# Patient Record
Sex: FEMALE | Race: WHITE | Employment: UNEMPLOYED | URBAN - METROPOLITAN AREA
[De-identification: names, ages, dates, MRNs, and addresses within clinical notes are randomized per-mention and may not be internally consistent; named-entity substitution may affect disease eponyms.]

---

## 2018-01-01 ENCOUNTER — APPOINTMENT (OUTPATIENT)
Dept: OCCUPATIONAL THERAPY | Facility: CLINIC | Age: 53
DRG: 163 | End: 2018-01-01
Attending: INTERNAL MEDICINE
Payer: MEDICARE

## 2018-01-01 ENCOUNTER — APPOINTMENT (OUTPATIENT)
Dept: CT IMAGING | Facility: CLINIC | Age: 53
DRG: 163 | End: 2018-01-01
Attending: INTERNAL MEDICINE
Payer: MEDICARE

## 2018-01-01 ENCOUNTER — APPOINTMENT (OUTPATIENT)
Dept: GENERAL RADIOLOGY | Facility: CLINIC | Age: 53
DRG: 163 | End: 2018-01-01
Attending: INTERNAL MEDICINE
Payer: MEDICARE

## 2018-01-01 ENCOUNTER — TRANSFERRED RECORDS (OUTPATIENT)
Dept: HEALTH INFORMATION MANAGEMENT | Facility: CLINIC | Age: 53
End: 2018-01-01

## 2018-01-01 ENCOUNTER — HOSPITAL ENCOUNTER (OUTPATIENT)
Facility: CLINIC | Age: 53
End: 2018-01-01
Attending: INTERNAL MEDICINE | Admitting: INTERNAL MEDICINE
Payer: MEDICARE

## 2018-01-01 ENCOUNTER — ANESTHESIA (OUTPATIENT)
Dept: SURGERY | Facility: CLINIC | Age: 53
DRG: 163 | End: 2018-01-01
Payer: MEDICARE

## 2018-01-01 ENCOUNTER — TELEPHONE (OUTPATIENT)
Dept: PULMONOLOGY | Facility: CLINIC | Age: 53
End: 2018-01-01

## 2018-01-01 ENCOUNTER — ANESTHESIA (OUTPATIENT)
Dept: MEDSURG UNIT | Facility: CLINIC | Age: 53
DRG: 163 | End: 2018-01-01
Payer: MEDICARE

## 2018-01-01 ENCOUNTER — APPOINTMENT (OUTPATIENT)
Dept: GENERAL RADIOLOGY | Facility: CLINIC | Age: 53
DRG: 163 | End: 2018-01-01
Attending: STUDENT IN AN ORGANIZED HEALTH CARE EDUCATION/TRAINING PROGRAM
Payer: MEDICARE

## 2018-01-01 ENCOUNTER — HOSPITAL ENCOUNTER (INPATIENT)
Facility: CLINIC | Age: 53
LOS: 5 days | Discharge: HOME | DRG: 163 | End: 2018-07-30
Attending: INTERNAL MEDICINE | Admitting: SURGERY
Payer: MEDICARE

## 2018-01-01 ENCOUNTER — APPOINTMENT (OUTPATIENT)
Dept: CARDIOLOGY | Facility: CLINIC | Age: 53
DRG: 163 | End: 2018-01-01
Attending: INTERNAL MEDICINE
Payer: MEDICARE

## 2018-01-01 ENCOUNTER — DOCUMENTATION ONLY (OUTPATIENT)
Dept: OTHER | Facility: CLINIC | Age: 53
End: 2018-01-01

## 2018-01-01 ENCOUNTER — ANESTHESIA EVENT (OUTPATIENT)
Dept: SURGERY | Facility: CLINIC | Age: 53
DRG: 163 | End: 2018-01-01
Payer: MEDICARE

## 2018-01-01 ENCOUNTER — ANESTHESIA EVENT (OUTPATIENT)
Dept: MEDSURG UNIT | Facility: CLINIC | Age: 53
DRG: 163 | End: 2018-01-01
Payer: MEDICARE

## 2018-01-01 ENCOUNTER — CARE COORDINATION (OUTPATIENT)
Dept: CARE COORDINATION | Facility: CLINIC | Age: 53
End: 2018-01-01

## 2018-01-01 VITALS
WEIGHT: 147.05 LBS | BODY MASS INDEX: 27.06 KG/M2 | TEMPERATURE: 98 F | RESPIRATION RATE: 20 BRPM | OXYGEN SATURATION: 99 % | HEART RATE: 72 BPM | DIASTOLIC BLOOD PRESSURE: 74 MMHG | SYSTOLIC BLOOD PRESSURE: 120 MMHG | HEIGHT: 62 IN

## 2018-01-01 DIAGNOSIS — J96.21 ACUTE ON CHRONIC RESPIRATORY FAILURE WITH HYPOXIA (H): Primary | ICD-10-CM

## 2018-01-01 LAB
ABO + RH BLD: NORMAL
ABO + RH BLD: NORMAL
ALBUMIN SERPL-MCNC: 3.1 G/DL (ref 3.4–5)
ALP SERPL-CCNC: 64 U/L (ref 40–150)
ALT SERPL W P-5'-P-CCNC: 33 U/L (ref 0–50)
ANION GAP SERPL CALCULATED.3IONS-SCNC: 12 MMOL/L (ref 3–14)
ANION GAP SERPL CALCULATED.3IONS-SCNC: 6 MMOL/L (ref 3–14)
ANION GAP SERPL CALCULATED.3IONS-SCNC: 6 MMOL/L (ref 3–14)
ANION GAP SERPL CALCULATED.3IONS-SCNC: 7 MMOL/L (ref 3–14)
ANION GAP SERPL CALCULATED.3IONS-SCNC: 8 MMOL/L (ref 3–14)
ANION GAP SERPL CALCULATED.3IONS-SCNC: 9 MMOL/L (ref 3–14)
APTT PPP: 26 SEC (ref 22–37)
AST SERPL W P-5'-P-CCNC: 29 U/L (ref 0–45)
BASE DEFICIT BLDA-SCNC: 0.5 MMOL/L
BASE DEFICIT BLDA-SCNC: 2.2 MMOL/L
BASE DEFICIT BLDA-SCNC: 4.4 MMOL/L
BASE EXCESS BLDV CALC-SCNC: 6.9 MMOL/L
BASOPHILS # BLD AUTO: 0 10E9/L (ref 0–0.2)
BASOPHILS NFR BLD AUTO: 0 %
BILIRUB SERPL-MCNC: 0.2 MG/DL (ref 0.2–1.3)
BLD GP AB SCN SERPL QL: NORMAL
BLOOD BANK CMNT PATIENT-IMP: NORMAL
BUN SERPL-MCNC: 10 MG/DL (ref 7–30)
BUN SERPL-MCNC: 10 MG/DL (ref 7–30)
BUN SERPL-MCNC: 4 MG/DL (ref 7–30)
BUN SERPL-MCNC: 5 MG/DL (ref 7–30)
BUN SERPL-MCNC: 5 MG/DL (ref 7–30)
BUN SERPL-MCNC: 6 MG/DL (ref 7–30)
CALCIUM SERPL-MCNC: 7.5 MG/DL (ref 8.5–10.1)
CALCIUM SERPL-MCNC: 7.6 MG/DL (ref 8.5–10.1)
CALCIUM SERPL-MCNC: 8.4 MG/DL (ref 8.5–10.1)
CALCIUM SERPL-MCNC: 8.5 MG/DL (ref 8.5–10.1)
CALCIUM SERPL-MCNC: 8.6 MG/DL (ref 8.5–10.1)
CALCIUM SERPL-MCNC: 9.1 MG/DL (ref 8.5–10.1)
CHLORIDE SERPL-SCNC: 103 MMOL/L (ref 94–109)
CHLORIDE SERPL-SCNC: 104 MMOL/L (ref 94–109)
CHLORIDE SERPL-SCNC: 105 MMOL/L (ref 94–109)
CHLORIDE SERPL-SCNC: 110 MMOL/L (ref 94–109)
CHLORIDE SERPL-SCNC: 113 MMOL/L (ref 94–109)
CHLORIDE SERPL-SCNC: 114 MMOL/L (ref 94–109)
CO2 SERPL-SCNC: 20 MMOL/L (ref 20–32)
CO2 SERPL-SCNC: 24 MMOL/L (ref 20–32)
CO2 SERPL-SCNC: 27 MMOL/L (ref 20–32)
CO2 SERPL-SCNC: 28 MMOL/L (ref 20–32)
CO2 SERPL-SCNC: 30 MMOL/L (ref 20–32)
CO2 SERPL-SCNC: 30 MMOL/L (ref 20–32)
COPATH REPORT: NORMAL
CREAT SERPL-MCNC: 0.5 MG/DL (ref 0.52–1.04)
CREAT SERPL-MCNC: 0.53 MG/DL (ref 0.52–1.04)
CREAT SERPL-MCNC: 0.6 MG/DL (ref 0.52–1.04)
CREAT SERPL-MCNC: 0.64 MG/DL (ref 0.52–1.04)
CREAT SERPL-MCNC: 0.69 MG/DL (ref 0.52–1.04)
CREAT SERPL-MCNC: 0.88 MG/DL (ref 0.52–1.04)
DIFFERENTIAL METHOD BLD: ABNORMAL
EOSINOPHIL # BLD AUTO: 0 10E9/L (ref 0–0.7)
EOSINOPHIL NFR BLD AUTO: 0 %
ERYTHROCYTE [DISTWIDTH] IN BLOOD BY AUTOMATED COUNT: 14.3 % (ref 10–15)
ERYTHROCYTE [DISTWIDTH] IN BLOOD BY AUTOMATED COUNT: 14.5 % (ref 10–15)
ERYTHROCYTE [DISTWIDTH] IN BLOOD BY AUTOMATED COUNT: 14.6 % (ref 10–15)
ERYTHROCYTE [DISTWIDTH] IN BLOOD BY AUTOMATED COUNT: 15.1 % (ref 10–15)
ERYTHROCYTE [DISTWIDTH] IN BLOOD BY AUTOMATED COUNT: 15.2 % (ref 10–15)
ERYTHROCYTE [DISTWIDTH] IN BLOOD BY AUTOMATED COUNT: 15.2 % (ref 10–15)
GFR SERPL CREATININE-BSD FRML MDRD: 67 ML/MIN/1.7M2
GFR SERPL CREATININE-BSD FRML MDRD: 88 ML/MIN/1.7M2
GFR SERPL CREATININE-BSD FRML MDRD: >90 ML/MIN/1.7M2
GLUCOSE BLDC GLUCOMTR-MCNC: 111 MG/DL (ref 70–99)
GLUCOSE BLDC GLUCOMTR-MCNC: 112 MG/DL (ref 70–99)
GLUCOSE BLDC GLUCOMTR-MCNC: 95 MG/DL (ref 70–99)
GLUCOSE SERPL-MCNC: 104 MG/DL (ref 70–99)
GLUCOSE SERPL-MCNC: 108 MG/DL (ref 70–99)
GLUCOSE SERPL-MCNC: 113 MG/DL (ref 70–99)
GLUCOSE SERPL-MCNC: 118 MG/DL (ref 70–99)
GLUCOSE SERPL-MCNC: 149 MG/DL (ref 70–99)
GLUCOSE SERPL-MCNC: 92 MG/DL (ref 70–99)
HCO3 BLD-SCNC: 24 MMOL/L (ref 21–28)
HCO3 BLD-SCNC: 25 MMOL/L (ref 21–28)
HCO3 BLD-SCNC: 25 MMOL/L (ref 21–28)
HCO3 BLDV-SCNC: 32 MMOL/L (ref 21–28)
HCT VFR BLD AUTO: 32.3 % (ref 35–47)
HCT VFR BLD AUTO: 33.1 % (ref 35–47)
HCT VFR BLD AUTO: 36.7 % (ref 35–47)
HCT VFR BLD AUTO: 37.4 % (ref 35–47)
HCT VFR BLD AUTO: 39.4 % (ref 35–47)
HCT VFR BLD AUTO: 39.6 % (ref 35–47)
HGB BLD-MCNC: 10.2 G/DL (ref 11.7–15.7)
HGB BLD-MCNC: 10.3 G/DL (ref 11.7–15.7)
HGB BLD-MCNC: 10.9 G/DL (ref 11.7–15.7)
HGB BLD-MCNC: 11.3 G/DL (ref 11.7–15.7)
HGB BLD-MCNC: 11.3 G/DL (ref 11.7–15.7)
HGB BLD-MCNC: 12.1 G/DL (ref 11.7–15.7)
HGB BLD-MCNC: 12.3 G/DL (ref 11.7–15.7)
HGB BLD-MCNC: 9.8 G/DL (ref 11.7–15.7)
IMM GRANULOCYTES # BLD: 0.1 10E9/L (ref 0–0.4)
IMM GRANULOCYTES NFR BLD: 0.3 %
INR PPP: 1.04 (ref 0.86–1.14)
INTERPRETATION ECG - MUSE: NORMAL
LACTATE BLD-SCNC: 0.7 MMOL/L (ref 0.7–2)
LYMPHOCYTES # BLD AUTO: 1.9 10E9/L (ref 0.8–5.3)
LYMPHOCYTES NFR BLD AUTO: 8.8 %
MAGNESIUM SERPL-MCNC: 1.9 MG/DL (ref 1.6–2.3)
MAGNESIUM SERPL-MCNC: 2 MG/DL (ref 1.6–2.3)
MAGNESIUM SERPL-MCNC: 2.1 MG/DL (ref 1.6–2.3)
MAGNESIUM SERPL-MCNC: 2.2 MG/DL (ref 1.6–2.3)
MAGNESIUM SERPL-MCNC: 2.2 MG/DL (ref 1.6–2.3)
MCH RBC QN AUTO: 26.5 PG (ref 26.5–33)
MCH RBC QN AUTO: 26.7 PG (ref 26.5–33)
MCH RBC QN AUTO: 26.8 PG (ref 26.5–33)
MCH RBC QN AUTO: 27.3 PG (ref 26.5–33)
MCHC RBC AUTO-ENTMCNC: 30.2 G/DL (ref 31.5–36.5)
MCHC RBC AUTO-ENTMCNC: 30.3 G/DL (ref 31.5–36.5)
MCHC RBC AUTO-ENTMCNC: 30.7 G/DL (ref 31.5–36.5)
MCHC RBC AUTO-ENTMCNC: 30.8 G/DL (ref 31.5–36.5)
MCHC RBC AUTO-ENTMCNC: 30.8 G/DL (ref 31.5–36.5)
MCHC RBC AUTO-ENTMCNC: 31.1 G/DL (ref 31.5–36.5)
MCV RBC AUTO: 87 FL (ref 78–100)
MCV RBC AUTO: 88 FL (ref 78–100)
MONOCYTES # BLD AUTO: 2.1 10E9/L (ref 0–1.3)
MONOCYTES NFR BLD AUTO: 9.5 %
MRSA DNA SPEC QL NAA+PROBE: NEGATIVE
NEUTROPHILS # BLD AUTO: 18 10E9/L (ref 1.6–8.3)
NEUTROPHILS NFR BLD AUTO: 81.4 %
NRBC # BLD AUTO: 0 10*3/UL
NRBC BLD AUTO-RTO: 0 /100
O2/TOTAL GAS SETTING VFR VENT: 100 %
O2/TOTAL GAS SETTING VFR VENT: 50 %
O2/TOTAL GAS SETTING VFR VENT: 60 %
O2/TOTAL GAS SETTING VFR VENT: ABNORMAL %
OXYHGB MFR BLD: 98 % (ref 92–100)
PCO2 BLD: 43 MM HG (ref 35–45)
PCO2 BLD: 48 MM HG (ref 35–45)
PCO2 BLD: 62 MM HG (ref 35–45)
PCO2 BLDV: 49 MM HG (ref 40–50)
PH BLD: 7.21 PH (ref 7.35–7.45)
PH BLD: 7.31 PH (ref 7.35–7.45)
PH BLD: 7.37 PH (ref 7.35–7.45)
PH BLDV: 7.43 PH (ref 7.32–7.43)
PHOSPHATE SERPL-MCNC: 2.4 MG/DL (ref 2.5–4.5)
PHOSPHATE SERPL-MCNC: 2.4 MG/DL (ref 2.5–4.5)
PHOSPHATE SERPL-MCNC: 2.7 MG/DL (ref 2.5–4.5)
PHOSPHATE SERPL-MCNC: 2.8 MG/DL (ref 2.5–4.5)
PHOSPHATE SERPL-MCNC: 3.2 MG/DL (ref 2.5–4.5)
PLATELET # BLD AUTO: 198 10E9/L (ref 150–450)
PLATELET # BLD AUTO: 211 10E9/L (ref 150–450)
PLATELET # BLD AUTO: 237 10E9/L (ref 150–450)
PLATELET # BLD AUTO: 274 10E9/L (ref 150–450)
PLATELET # BLD AUTO: 277 10E9/L (ref 150–450)
PLATELET # BLD AUTO: 349 10E9/L (ref 150–450)
PO2 BLD: 177 MM HG (ref 80–105)
PO2 BLD: 237 MM HG (ref 80–105)
PO2 BLD: 484 MM HG (ref 80–105)
PO2 BLDV: 37 MM HG (ref 25–47)
POTASSIUM SERPL-SCNC: 2.9 MMOL/L (ref 3.4–5.3)
POTASSIUM SERPL-SCNC: 3.2 MMOL/L (ref 3.4–5.3)
POTASSIUM SERPL-SCNC: 3.2 MMOL/L (ref 3.4–5.3)
POTASSIUM SERPL-SCNC: 3.4 MMOL/L (ref 3.4–5.3)
POTASSIUM SERPL-SCNC: 3.5 MMOL/L (ref 3.4–5.3)
POTASSIUM SERPL-SCNC: 3.6 MMOL/L (ref 3.4–5.3)
POTASSIUM SERPL-SCNC: 3.6 MMOL/L (ref 3.4–5.3)
POTASSIUM SERPL-SCNC: 3.8 MMOL/L (ref 3.4–5.3)
POTASSIUM SERPL-SCNC: 3.8 MMOL/L (ref 3.4–5.3)
PROCALCITONIN SERPL-MCNC: <0.05 NG/ML
PROCALCITONIN SERPL-MCNC: <0.05 NG/ML
PROT SERPL-MCNC: 7 G/DL (ref 6.8–8.8)
RBC # BLD AUTO: 3.67 10E12/L (ref 3.8–5.2)
RBC # BLD AUTO: 3.81 10E12/L (ref 3.8–5.2)
RBC # BLD AUTO: 4.22 10E12/L (ref 3.8–5.2)
RBC # BLD AUTO: 4.26 10E12/L (ref 3.8–5.2)
RBC # BLD AUTO: 4.5 10E12/L (ref 3.8–5.2)
RBC # BLD AUTO: 4.52 10E12/L (ref 3.8–5.2)
SODIUM SERPL-SCNC: 139 MMOL/L (ref 133–144)
SODIUM SERPL-SCNC: 140 MMOL/L (ref 133–144)
SODIUM SERPL-SCNC: 142 MMOL/L (ref 133–144)
SODIUM SERPL-SCNC: 144 MMOL/L (ref 133–144)
SODIUM SERPL-SCNC: 145 MMOL/L (ref 133–144)
SODIUM SERPL-SCNC: 145 MMOL/L (ref 133–144)
SPECIMEN EXP DATE BLD: NORMAL
SPECIMEN SOURCE: NORMAL
TROPONIN I SERPL-MCNC: <0.015 UG/L (ref 0–0.04)
WBC # BLD AUTO: 12.6 10E9/L (ref 4–11)
WBC # BLD AUTO: 22.1 10E9/L (ref 4–11)
WBC # BLD AUTO: 7.4 10E9/L (ref 4–11)
WBC # BLD AUTO: 9 10E9/L (ref 4–11)

## 2018-01-01 PROCEDURE — 94640 AIRWAY INHALATION TREATMENT: CPT | Mod: 76

## 2018-01-01 PROCEDURE — 85018 HEMOGLOBIN: CPT | Performed by: STUDENT IN AN ORGANIZED HEALTH CARE EDUCATION/TRAINING PROGRAM

## 2018-01-01 PROCEDURE — 5A1935Z RESPIRATORY VENTILATION, LESS THAN 24 CONSECUTIVE HOURS: ICD-10-PCS | Performed by: SURGERY

## 2018-01-01 PROCEDURE — 93005 ELECTROCARDIOGRAM TRACING: CPT

## 2018-01-01 PROCEDURE — 25000128 H RX IP 250 OP 636: Performed by: NURSE ANESTHETIST, CERTIFIED REGISTERED

## 2018-01-01 PROCEDURE — 25000125 ZZHC RX 250: Performed by: NURSE ANESTHETIST, CERTIFIED REGISTERED

## 2018-01-01 PROCEDURE — 97535 SELF CARE MNGMENT TRAINING: CPT | Mod: GO | Performed by: OCCUPATIONAL THERAPIST

## 2018-01-01 PROCEDURE — A9270 NON-COVERED ITEM OR SERVICE: HCPCS | Mod: GY | Performed by: STUDENT IN AN ORGANIZED HEALTH CARE EDUCATION/TRAINING PROGRAM

## 2018-01-01 PROCEDURE — 99233 SBSQ HOSP IP/OBS HIGH 50: CPT | Mod: GC | Performed by: INTERNAL MEDICINE

## 2018-01-01 PROCEDURE — 97530 THERAPEUTIC ACTIVITIES: CPT | Mod: GO

## 2018-01-01 PROCEDURE — A9270 NON-COVERED ITEM OR SERVICE: HCPCS | Mod: GY | Performed by: INTERNAL MEDICINE

## 2018-01-01 PROCEDURE — 85025 COMPLETE CBC W/AUTO DIFF WBC: CPT | Performed by: INTERNAL MEDICINE

## 2018-01-01 PROCEDURE — 40000275 ZZH STATISTIC RCP TIME EA 10 MIN

## 2018-01-01 PROCEDURE — 84100 ASSAY OF PHOSPHORUS: CPT | Performed by: INTERNAL MEDICINE

## 2018-01-01 PROCEDURE — C9399 UNCLASSIFIED DRUGS OR BIOLOG: HCPCS | Performed by: NURSE ANESTHETIST, CERTIFIED REGISTERED

## 2018-01-01 PROCEDURE — 25000128 H RX IP 250 OP 636: Performed by: STUDENT IN AN ORGANIZED HEALTH CARE EDUCATION/TRAINING PROGRAM

## 2018-01-01 PROCEDURE — 25000128 H RX IP 250 OP 636: Performed by: INTERNAL MEDICINE

## 2018-01-01 PROCEDURE — 94640 AIRWAY INHALATION TREATMENT: CPT

## 2018-01-01 PROCEDURE — 93306 TTE W/DOPPLER COMPLETE: CPT | Mod: 26 | Performed by: INTERNAL MEDICINE

## 2018-01-01 PROCEDURE — 37000009 ZZH ANESTHESIA TECHNICAL FEE, EACH ADDTL 15 MIN: Performed by: INTERNAL MEDICINE

## 2018-01-01 PROCEDURE — 36415 COLL VENOUS BLD VENIPUNCTURE: CPT | Performed by: INTERNAL MEDICINE

## 2018-01-01 PROCEDURE — 84132 ASSAY OF SERUM POTASSIUM: CPT | Performed by: INTERNAL MEDICINE

## 2018-01-01 PROCEDURE — 94640 AIRWAY INHALATION TREATMENT: CPT | Mod: 76 | Performed by: OPTOMETRIST

## 2018-01-01 PROCEDURE — 25000132 ZZH RX MED GY IP 250 OP 250 PS 637: Mod: GY | Performed by: INTERNAL MEDICINE

## 2018-01-01 PROCEDURE — 94799 UNLISTED PULMONARY SVC/PX: CPT

## 2018-01-01 PROCEDURE — 87640 STAPH A DNA AMP PROBE: CPT | Performed by: STUDENT IN AN ORGANIZED HEALTH CARE EDUCATION/TRAINING PROGRAM

## 2018-01-01 PROCEDURE — 37000008 ZZH ANESTHESIA TECHNICAL FEE, 1ST 30 MIN: Performed by: INTERNAL MEDICINE

## 2018-01-01 PROCEDURE — 71260 CT THORAX DX C+: CPT

## 2018-01-01 PROCEDURE — 40000275 ZZH STATISTIC RCP TIME EA 10 MIN: Performed by: OPTOMETRIST

## 2018-01-01 PROCEDURE — 0B578ZZ DESTRUCTION OF LEFT MAIN BRONCHUS, VIA NATURAL OR ARTIFICIAL OPENING ENDOSCOPIC: ICD-10-PCS | Performed by: INTERNAL MEDICINE

## 2018-01-01 PROCEDURE — 85018 HEMOGLOBIN: CPT | Performed by: INTERNAL MEDICINE

## 2018-01-01 PROCEDURE — 25000125 ZZHC RX 250: Performed by: STUDENT IN AN ORGANIZED HEALTH CARE EDUCATION/TRAINING PROGRAM

## 2018-01-01 PROCEDURE — 27210794 ZZH OR GENERAL SUPPLY STERILE: Performed by: INTERNAL MEDICINE

## 2018-01-01 PROCEDURE — 71045 X-RAY EXAM CHEST 1 VIEW: CPT

## 2018-01-01 PROCEDURE — 25000132 ZZH RX MED GY IP 250 OP 250 PS 637: Mod: GY | Performed by: STUDENT IN AN ORGANIZED HEALTH CARE EDUCATION/TRAINING PROGRAM

## 2018-01-01 PROCEDURE — 84145 PROCALCITONIN (PCT): CPT | Performed by: INTERNAL MEDICINE

## 2018-01-01 PROCEDURE — 86850 RBC ANTIBODY SCREEN: CPT | Performed by: STUDENT IN AN ORGANIZED HEALTH CARE EDUCATION/TRAINING PROGRAM

## 2018-01-01 PROCEDURE — 20000004 ZZH R&B ICU UMMC

## 2018-01-01 PROCEDURE — 36592 COLLECT BLOOD FROM PICC: CPT | Performed by: STUDENT IN AN ORGANIZED HEALTH CARE EDUCATION/TRAINING PROGRAM

## 2018-01-01 PROCEDURE — 40000277 XR SURGERY CARM FLUORO LESS THAN 5 MIN W STILLS: Mod: TC

## 2018-01-01 PROCEDURE — 93010 ELECTROCARDIOGRAM REPORT: CPT | Performed by: INTERNAL MEDICINE

## 2018-01-01 PROCEDURE — 80048 BASIC METABOLIC PNL TOTAL CA: CPT | Performed by: STUDENT IN AN ORGANIZED HEALTH CARE EDUCATION/TRAINING PROGRAM

## 2018-01-01 PROCEDURE — 25000125 ZZHC RX 250: Performed by: INTERNAL MEDICINE

## 2018-01-01 PROCEDURE — 0B778DZ DILATION OF LEFT MAIN BRONCHUS WITH INTRALUMINAL DEVICE, VIA NATURAL OR ARTIFICIAL OPENING ENDOSCOPIC: ICD-10-PCS | Performed by: INTERNAL MEDICINE

## 2018-01-01 PROCEDURE — 86901 BLOOD TYPING SEROLOGIC RH(D): CPT | Performed by: STUDENT IN AN ORGANIZED HEALTH CARE EDUCATION/TRAINING PROGRAM

## 2018-01-01 PROCEDURE — 99291 CRITICAL CARE FIRST HOUR: CPT | Mod: GC | Performed by: SURGERY

## 2018-01-01 PROCEDURE — 87641 MR-STAPH DNA AMP PROBE: CPT | Performed by: STUDENT IN AN ORGANIZED HEALTH CARE EDUCATION/TRAINING PROGRAM

## 2018-01-01 PROCEDURE — 40000274 ZZH STATISTIC RCP CONSULT EA 30 MIN

## 2018-01-01 PROCEDURE — 97165 OT EVAL LOW COMPLEX 30 MIN: CPT | Mod: GO | Performed by: OCCUPATIONAL THERAPIST

## 2018-01-01 PROCEDURE — 40000281 ZZH STATISTIC TRANSPORT TIME EA 15 MIN

## 2018-01-01 PROCEDURE — 97535 SELF CARE MNGMENT TRAINING: CPT | Mod: GO

## 2018-01-01 PROCEDURE — 0BD78ZX EXTRACTION OF LEFT MAIN BRONCHUS, VIA NATURAL OR ARTIFICIAL OPENING ENDOSCOPIC, DIAGNOSTIC: ICD-10-PCS | Performed by: INTERNAL MEDICINE

## 2018-01-01 PROCEDURE — 40000986 XR ABDOMEN PORT 1 VW

## 2018-01-01 PROCEDURE — 94799 UNLISTED PULMONARY SVC/PX: CPT | Performed by: OPTOMETRIST

## 2018-01-01 PROCEDURE — 85610 PROTHROMBIN TIME: CPT | Performed by: STUDENT IN AN ORGANIZED HEALTH CARE EDUCATION/TRAINING PROGRAM

## 2018-01-01 PROCEDURE — 88305 TISSUE EXAM BY PATHOLOGIST: CPT | Performed by: INTERNAL MEDICINE

## 2018-01-01 PROCEDURE — 85027 COMPLETE CBC AUTOMATED: CPT | Performed by: STUDENT IN AN ORGANIZED HEALTH CARE EDUCATION/TRAINING PROGRAM

## 2018-01-01 PROCEDURE — 27211040 ZZH CONTINUOUS NEBULIZER MICRO PUMP

## 2018-01-01 PROCEDURE — 12000006 ZZH R&B IMCU INTERMEDIATE UMMC

## 2018-01-01 PROCEDURE — 82803 BLOOD GASES ANY COMBINATION: CPT | Performed by: INTERNAL MEDICINE

## 2018-01-01 PROCEDURE — 84484 ASSAY OF TROPONIN QUANT: CPT | Performed by: INTERNAL MEDICINE

## 2018-01-01 PROCEDURE — 00000146 ZZHCL STATISTIC GLUCOSE BY METER IP

## 2018-01-01 PROCEDURE — 36415 COLL VENOUS BLD VENIPUNCTURE: CPT | Performed by: STUDENT IN AN ORGANIZED HEALTH CARE EDUCATION/TRAINING PROGRAM

## 2018-01-01 PROCEDURE — 36600 WITHDRAWAL OF ARTERIAL BLOOD: CPT

## 2018-01-01 PROCEDURE — 94660 CPAP INITIATION&MGMT: CPT

## 2018-01-01 PROCEDURE — 0BJ08ZZ INSPECTION OF TRACHEOBRONCHIAL TREE, VIA NATURAL OR ARTIFICIAL OPENING ENDOSCOPIC: ICD-10-PCS | Performed by: INTERNAL MEDICINE

## 2018-01-01 PROCEDURE — 99233 SBSQ HOSP IP/OBS HIGH 50: CPT | Performed by: INTERNAL MEDICINE

## 2018-01-01 PROCEDURE — 12000001 ZZH R&B MED SURG/OB UMMC

## 2018-01-01 PROCEDURE — 97110 THERAPEUTIC EXERCISES: CPT | Mod: GO | Performed by: OCCUPATIONAL THERAPIST

## 2018-01-01 PROCEDURE — 83735 ASSAY OF MAGNESIUM: CPT | Performed by: INTERNAL MEDICINE

## 2018-01-01 PROCEDURE — 94003 VENT MGMT INPAT SUBQ DAY: CPT

## 2018-01-01 PROCEDURE — 40000802 ZZH SITE CHECK

## 2018-01-01 PROCEDURE — 80053 COMPREHEN METABOLIC PANEL: CPT | Performed by: INTERNAL MEDICINE

## 2018-01-01 PROCEDURE — 83735 ASSAY OF MAGNESIUM: CPT | Performed by: STUDENT IN AN ORGANIZED HEALTH CARE EDUCATION/TRAINING PROGRAM

## 2018-01-01 PROCEDURE — 40000133 ZZH STATISTIC OT WARD VISIT

## 2018-01-01 PROCEDURE — 40000986 XR CHEST PORT 1 VW

## 2018-01-01 PROCEDURE — 36000061 ZZH SURGERY LEVEL 3 W FLUORO 1ST 30 MIN - UMMC: Performed by: INTERNAL MEDICINE

## 2018-01-01 PROCEDURE — 85730 THROMBOPLASTIN TIME PARTIAL: CPT | Performed by: STUDENT IN AN ORGANIZED HEALTH CARE EDUCATION/TRAINING PROGRAM

## 2018-01-01 PROCEDURE — 71000016 ZZH RECOVERY PHASE 1 LEVEL 3 FIRST HR: Performed by: INTERNAL MEDICINE

## 2018-01-01 PROCEDURE — 36592 COLLECT BLOOD FROM PICC: CPT | Performed by: INTERNAL MEDICINE

## 2018-01-01 PROCEDURE — 25500064 ZZH RX 255 OP 636: Performed by: INTERNAL MEDICINE

## 2018-01-01 PROCEDURE — 83605 ASSAY OF LACTIC ACID: CPT | Performed by: INTERNAL MEDICINE

## 2018-01-01 PROCEDURE — 84100 ASSAY OF PHOSPHORUS: CPT | Performed by: STUDENT IN AN ORGANIZED HEALTH CARE EDUCATION/TRAINING PROGRAM

## 2018-01-01 PROCEDURE — 82803 BLOOD GASES ANY COMBINATION: CPT | Performed by: STUDENT IN AN ORGANIZED HEALTH CARE EDUCATION/TRAINING PROGRAM

## 2018-01-01 PROCEDURE — 82805 BLOOD GASES W/O2 SATURATION: CPT | Performed by: INTERNAL MEDICINE

## 2018-01-01 PROCEDURE — 80048 BASIC METABOLIC PNL TOTAL CA: CPT | Performed by: INTERNAL MEDICINE

## 2018-01-01 PROCEDURE — C1874 STENT, COATED/COV W/DEL SYS: HCPCS | Performed by: INTERNAL MEDICINE

## 2018-01-01 PROCEDURE — 97530 THERAPEUTIC ACTIVITIES: CPT | Mod: GO | Performed by: OCCUPATIONAL THERAPIST

## 2018-01-01 PROCEDURE — 40000561 ZZH STATISTIC CODE BLUE NO ACCESS REQUIRED

## 2018-01-01 PROCEDURE — 0B528ZZ DESTRUCTION OF CARINA, VIA NATURAL OR ARTIFICIAL OPENING ENDOSCOPIC: ICD-10-PCS | Performed by: INTERNAL MEDICINE

## 2018-01-01 PROCEDURE — 25000125 ZZHC RX 250: Performed by: ANESTHESIOLOGY

## 2018-01-01 PROCEDURE — 40000133 ZZH STATISTIC OT WARD VISIT: Performed by: OCCUPATIONAL THERAPIST

## 2018-01-01 PROCEDURE — 0B978ZZ DRAINAGE OF LEFT MAIN BRONCHUS, VIA NATURAL OR ARTIFICIAL OPENING ENDOSCOPIC: ICD-10-PCS | Performed by: INTERNAL MEDICINE

## 2018-01-01 PROCEDURE — 99223 1ST HOSP IP/OBS HIGH 75: CPT | Mod: GC | Performed by: INTERNAL MEDICINE

## 2018-01-01 PROCEDURE — 40000671 ZZH STATISTIC ANESTHESIA CASE

## 2018-01-01 PROCEDURE — 84145 PROCALCITONIN (PCT): CPT | Performed by: STUDENT IN AN ORGANIZED HEALTH CARE EDUCATION/TRAINING PROGRAM

## 2018-01-01 PROCEDURE — 40000893 ZZH STATISTIC PT IP EVAL DEFER

## 2018-01-01 PROCEDURE — C1726 CATH, BAL DIL, NON-VASCULAR: HCPCS | Performed by: INTERNAL MEDICINE

## 2018-01-01 PROCEDURE — 40000141 ZZH STATISTIC PERIPHERAL IV START W/O US GUIDANCE

## 2018-01-01 PROCEDURE — 99239 HOSP IP/OBS DSCHRG MGMT >30: CPT | Performed by: INTERNAL MEDICINE

## 2018-01-01 PROCEDURE — 86900 BLOOD TYPING SEROLOGIC ABO: CPT | Performed by: STUDENT IN AN ORGANIZED HEALTH CARE EDUCATION/TRAINING PROGRAM

## 2018-01-01 PROCEDURE — 99291 CRITICAL CARE FIRST HOUR: CPT | Mod: 25 | Performed by: INTERNAL MEDICINE

## 2018-01-01 PROCEDURE — 25000128 H RX IP 250 OP 636

## 2018-01-01 PROCEDURE — 71000017 ZZH RECOVERY PHASE 1 LEVEL 3 EA ADDTL HR: Performed by: INTERNAL MEDICINE

## 2018-01-01 PROCEDURE — 94002 VENT MGMT INPAT INIT DAY: CPT

## 2018-01-01 PROCEDURE — 36000059 ZZH SURGERY LEVEL 3 EA 15 ADDTL MIN UMMC: Performed by: INTERNAL MEDICINE

## 2018-01-01 DEVICE — STENT BRONCHIAL AERO 14X40MM 90129-215: Type: IMPLANTABLE DEVICE | Site: BRONCHUS | Status: FUNCTIONAL

## 2018-01-01 RX ORDER — PANTOPRAZOLE SODIUM 40 MG/1
40 TABLET, DELAYED RELEASE ORAL
Status: DISCONTINUED | OUTPATIENT
Start: 2018-01-01 | End: 2018-01-01

## 2018-01-01 RX ORDER — IPRATROPIUM BROMIDE AND ALBUTEROL SULFATE 2.5; .5 MG/3ML; MG/3ML
1 SOLUTION RESPIRATORY (INHALATION) EVERY 6 HOURS PRN
COMMUNITY

## 2018-01-01 RX ORDER — TOPIRAMATE 50 MG/1
200 TABLET, FILM COATED ORAL AT BEDTIME
Status: DISCONTINUED | OUTPATIENT
Start: 2018-01-01 | End: 2018-01-01

## 2018-01-01 RX ORDER — ESCITALOPRAM OXALATE 10 MG/1
10 TABLET ORAL DAILY
Status: DISCONTINUED | OUTPATIENT
Start: 2018-01-01 | End: 2018-01-01 | Stop reason: HOSPADM

## 2018-01-01 RX ORDER — OXYCODONE HYDROCHLORIDE 5 MG/1
5-10 TABLET ORAL
Status: DISCONTINUED | OUTPATIENT
Start: 2018-01-01 | End: 2018-01-01

## 2018-01-01 RX ORDER — SODIUM CHLORIDE FOR INHALATION 3 %
3 VIAL, NEBULIZER (ML) INHALATION 2 TIMES DAILY
Qty: 84 ML | Refills: 1 | Status: SHIPPED | OUTPATIENT
Start: 2018-01-01

## 2018-01-01 RX ORDER — POTASSIUM CHLORIDE 29.8 MG/ML
20 INJECTION INTRAVENOUS
Status: DISCONTINUED | OUTPATIENT
Start: 2018-01-01 | End: 2018-01-01

## 2018-01-01 RX ORDER — POTASSIUM CL/LIDO/0.9 % NACL 10MEQ/0.1L
10 INTRAVENOUS SOLUTION, PIGGYBACK (ML) INTRAVENOUS
Status: DISCONTINUED | OUTPATIENT
Start: 2018-01-01 | End: 2018-01-01

## 2018-01-01 RX ORDER — ONDANSETRON 4 MG/1
4 TABLET, ORALLY DISINTEGRATING ORAL EVERY 30 MIN PRN
Status: DISCONTINUED | OUTPATIENT
Start: 2018-01-01 | End: 2018-01-01 | Stop reason: HOSPADM

## 2018-01-01 RX ORDER — SODIUM CHLORIDE, SODIUM LACTATE, POTASSIUM CHLORIDE, CALCIUM CHLORIDE 600; 310; 30; 20 MG/100ML; MG/100ML; MG/100ML; MG/100ML
INJECTION, SOLUTION INTRAVENOUS CONTINUOUS PRN
Status: DISCONTINUED | OUTPATIENT
Start: 2018-01-01 | End: 2018-01-01

## 2018-01-01 RX ORDER — METHYLPREDNISOLONE SODIUM SUCCINATE 125 MG/2ML
125 INJECTION, POWDER, LYOPHILIZED, FOR SOLUTION INTRAMUSCULAR; INTRAVENOUS ONCE
Status: COMPLETED | OUTPATIENT
Start: 2018-01-01 | End: 2018-01-01

## 2018-01-01 RX ORDER — IPRATROPIUM BROMIDE AND ALBUTEROL SULFATE 2.5; .5 MG/3ML; MG/3ML
3 SOLUTION RESPIRATORY (INHALATION)
Status: DISCONTINUED | OUTPATIENT
Start: 2018-01-01 | End: 2018-01-01

## 2018-01-01 RX ORDER — FUROSEMIDE 10 MG/ML
20 INJECTION INTRAMUSCULAR; INTRAVENOUS ONCE
Status: COMPLETED | OUTPATIENT
Start: 2018-01-01 | End: 2018-01-01

## 2018-01-01 RX ORDER — SODIUM CHLORIDE, SODIUM LACTATE, POTASSIUM CHLORIDE, CALCIUM CHLORIDE 600; 310; 30; 20 MG/100ML; MG/100ML; MG/100ML; MG/100ML
INJECTION, SOLUTION INTRAVENOUS CONTINUOUS
Status: DISCONTINUED | OUTPATIENT
Start: 2018-01-01 | End: 2018-01-01 | Stop reason: HOSPADM

## 2018-01-01 RX ORDER — OXYCODONE HYDROCHLORIDE 5 MG/1
5 TABLET ORAL EVERY 6 HOURS
Status: DISCONTINUED | OUTPATIENT
Start: 2018-01-01 | End: 2018-01-01

## 2018-01-01 RX ORDER — POTASSIUM CHLORIDE 7.45 MG/ML
10 INJECTION INTRAVENOUS
Status: DISCONTINUED | OUTPATIENT
Start: 2018-01-01 | End: 2018-01-01

## 2018-01-01 RX ORDER — POTASSIUM CL/LIDO/0.9 % NACL 10MEQ/0.1L
10 INTRAVENOUS SOLUTION, PIGGYBACK (ML) INTRAVENOUS
Status: DISCONTINUED | OUTPATIENT
Start: 2018-01-01 | End: 2018-01-01 | Stop reason: HOSPADM

## 2018-01-01 RX ORDER — TRAZODONE HYDROCHLORIDE 50 MG/1
50 TABLET, FILM COATED ORAL AT BEDTIME
Status: DISCONTINUED | OUTPATIENT
Start: 2018-01-01 | End: 2018-01-01 | Stop reason: HOSPADM

## 2018-01-01 RX ORDER — TOPIRAMATE 50 MG/1
50 TABLET, FILM COATED ORAL EVERY MORNING
Status: DISCONTINUED | OUTPATIENT
Start: 2018-01-01 | End: 2018-01-01 | Stop reason: HOSPADM

## 2018-01-01 RX ORDER — POLYETHYLENE GLYCOL 3350 17 G/17G
1 POWDER, FOR SOLUTION ORAL 2 TIMES DAILY PRN
COMMUNITY

## 2018-01-01 RX ORDER — PROPOFOL 10 MG/ML
INJECTION, EMULSION INTRAVENOUS PRN
Status: DISCONTINUED | OUTPATIENT
Start: 2018-01-01 | End: 2018-01-01

## 2018-01-01 RX ORDER — OXYCODONE HCL 10 MG/1
30 TABLET, FILM COATED, EXTENDED RELEASE ORAL EVERY 12 HOURS
Status: DISCONTINUED | OUTPATIENT
Start: 2018-01-01 | End: 2018-01-01

## 2018-01-01 RX ORDER — ALBUTEROL SULFATE 90 UG/1
2 AEROSOL, METERED RESPIRATORY (INHALATION) EVERY 4 HOURS PRN
COMMUNITY

## 2018-01-01 RX ORDER — OXYCODONE HYDROCHLORIDE 5 MG/1
5-10 TABLET ORAL EVERY 4 HOURS PRN
Status: DISCONTINUED | OUTPATIENT
Start: 2018-01-01 | End: 2018-01-01

## 2018-01-01 RX ORDER — NALOXONE HYDROCHLORIDE 0.4 MG/ML
.1-.4 INJECTION, SOLUTION INTRAMUSCULAR; INTRAVENOUS; SUBCUTANEOUS
Status: DISCONTINUED | OUTPATIENT
Start: 2018-01-01 | End: 2018-01-01

## 2018-01-01 RX ORDER — IPRATROPIUM BROMIDE AND ALBUTEROL SULFATE 2.5; .5 MG/3ML; MG/3ML
3 SOLUTION RESPIRATORY (INHALATION)
Status: DISCONTINUED | OUTPATIENT
Start: 2018-01-01 | End: 2018-01-01 | Stop reason: HOSPADM

## 2018-01-01 RX ORDER — PROCHLORPERAZINE MALEATE 5 MG
10 TABLET ORAL EVERY 6 HOURS PRN
Status: DISCONTINUED | OUTPATIENT
Start: 2018-01-01 | End: 2018-01-01 | Stop reason: HOSPADM

## 2018-01-01 RX ORDER — PROPOFOL 10 MG/ML
INJECTION, EMULSION INTRAVENOUS
Status: COMPLETED
Start: 2018-01-01 | End: 2018-01-01

## 2018-01-01 RX ORDER — HYDROMORPHONE HYDROCHLORIDE 1 MG/ML
.3-.5 INJECTION, SOLUTION INTRAMUSCULAR; INTRAVENOUS; SUBCUTANEOUS
Status: DISCONTINUED | OUTPATIENT
Start: 2018-01-01 | End: 2018-01-01

## 2018-01-01 RX ORDER — LEVOTHYROXINE SODIUM 75 UG/1
150 TABLET ORAL DAILY
Status: DISCONTINUED | OUTPATIENT
Start: 2018-01-01 | End: 2018-01-01 | Stop reason: HOSPADM

## 2018-01-01 RX ORDER — NALOXONE HYDROCHLORIDE 0.4 MG/ML
.1-.4 INJECTION, SOLUTION INTRAMUSCULAR; INTRAVENOUS; SUBCUTANEOUS
Status: DISCONTINUED | OUTPATIENT
Start: 2018-01-01 | End: 2018-01-01 | Stop reason: HOSPADM

## 2018-01-01 RX ORDER — TOPIRAMATE 50 MG/1
150 TABLET, FILM COATED ORAL EVERY MORNING
Status: DISCONTINUED | OUTPATIENT
Start: 2018-01-01 | End: 2018-01-01

## 2018-01-01 RX ORDER — PROPOFOL 10 MG/ML
10-20 INJECTION, EMULSION INTRAVENOUS EVERY 30 MIN PRN
Status: DISCONTINUED | OUTPATIENT
Start: 2018-01-01 | End: 2018-01-01

## 2018-01-01 RX ORDER — PIPERACILLIN SODIUM, TAZOBACTAM SODIUM 4; .5 G/20ML; G/20ML
4.5 INJECTION, POWDER, LYOPHILIZED, FOR SOLUTION INTRAVENOUS EVERY 6 HOURS
Status: DISCONTINUED | OUTPATIENT
Start: 2018-01-01 | End: 2018-01-01

## 2018-01-01 RX ORDER — CAFFEINE 200 MG
200 TABLET ORAL
Status: DISCONTINUED | OUTPATIENT
Start: 2018-01-01 | End: 2018-01-01 | Stop reason: HOSPADM

## 2018-01-01 RX ORDER — POTASSIUM CHLORIDE 1.5 G/1.58G
20-40 POWDER, FOR SOLUTION ORAL
Status: DISCONTINUED | OUTPATIENT
Start: 2018-01-01 | End: 2018-01-01 | Stop reason: HOSPADM

## 2018-01-01 RX ORDER — SODIUM CHLORIDE, SODIUM LACTATE, POTASSIUM CHLORIDE, CALCIUM CHLORIDE 600; 310; 30; 20 MG/100ML; MG/100ML; MG/100ML; MG/100ML
INJECTION, SOLUTION INTRAVENOUS CONTINUOUS
Status: DISCONTINUED | OUTPATIENT
Start: 2018-01-01 | End: 2018-01-01

## 2018-01-01 RX ORDER — PROPOFOL 10 MG/ML
INJECTION, EMULSION INTRAVENOUS CONTINUOUS PRN
Status: DISCONTINUED | OUTPATIENT
Start: 2018-01-01 | End: 2018-01-01

## 2018-01-01 RX ORDER — PROPOFOL 10 MG/ML
5-75 INJECTION, EMULSION INTRAVENOUS CONTINUOUS
Status: DISCONTINUED | OUTPATIENT
Start: 2018-01-01 | End: 2018-01-01

## 2018-01-01 RX ORDER — TOPIRAMATE 50 MG/1
150 TABLET, FILM COATED ORAL AT BEDTIME
Status: DISCONTINUED | OUTPATIENT
Start: 2018-01-01 | End: 2018-01-01

## 2018-01-01 RX ORDER — IPRATROPIUM BROMIDE AND ALBUTEROL SULFATE 2.5; .5 MG/3ML; MG/3ML
3 SOLUTION RESPIRATORY (INHALATION) ONCE
Status: DISCONTINUED | OUTPATIENT
Start: 2018-01-01 | End: 2018-01-01

## 2018-01-01 RX ORDER — ONDANSETRON 2 MG/ML
4 INJECTION INTRAMUSCULAR; INTRAVENOUS EVERY 6 HOURS PRN
Status: DISCONTINUED | OUTPATIENT
Start: 2018-01-01 | End: 2018-01-01 | Stop reason: HOSPADM

## 2018-01-01 RX ORDER — ONDANSETRON 2 MG/ML
4 INJECTION INTRAMUSCULAR; INTRAVENOUS EVERY 30 MIN PRN
Status: DISCONTINUED | OUTPATIENT
Start: 2018-01-01 | End: 2018-01-01 | Stop reason: HOSPADM

## 2018-01-01 RX ORDER — FENTANYL CITRATE 50 UG/ML
25-50 INJECTION, SOLUTION INTRAMUSCULAR; INTRAVENOUS
Status: DISCONTINUED | OUTPATIENT
Start: 2018-01-01 | End: 2018-01-01

## 2018-01-01 RX ORDER — OXYCODONE HYDROCHLORIDE 15 MG/1
30 TABLET, FILM COATED, EXTENDED RELEASE ORAL
Status: DISCONTINUED | OUTPATIENT
Start: 2018-01-01 | End: 2018-01-01 | Stop reason: HOSPADM

## 2018-01-01 RX ORDER — HEPARIN SODIUM,PORCINE 10 UNIT/ML
5-10 VIAL (ML) INTRAVENOUS
Status: DISCONTINUED | OUTPATIENT
Start: 2018-01-01 | End: 2018-01-01 | Stop reason: HOSPADM

## 2018-01-01 RX ORDER — HEPARIN SODIUM (PORCINE) LOCK FLUSH IV SOLN 100 UNIT/ML 100 UNIT/ML
5 SOLUTION INTRAVENOUS
Status: DISCONTINUED | OUTPATIENT
Start: 2018-01-01 | End: 2018-01-01 | Stop reason: HOSPADM

## 2018-01-01 RX ORDER — TOPIRAMATE 50 MG/1
100 TABLET, FILM COATED ORAL AT BEDTIME
Status: DISCONTINUED | OUTPATIENT
Start: 2018-01-01 | End: 2018-01-01 | Stop reason: HOSPADM

## 2018-01-01 RX ORDER — HEPARIN SODIUM,PORCINE 10 UNIT/ML
5-10 VIAL (ML) INTRAVENOUS EVERY 24 HOURS
Status: DISCONTINUED | OUTPATIENT
Start: 2018-01-01 | End: 2018-01-01 | Stop reason: HOSPADM

## 2018-01-01 RX ORDER — ATROPINE SULFATE 0.1 MG/ML
0.4 INJECTION INTRAVENOUS EVERY 30 MIN PRN
Status: DISCONTINUED | OUTPATIENT
Start: 2018-01-01 | End: 2018-01-01

## 2018-01-01 RX ORDER — SODIUM CHLORIDE 9 MG/ML
INJECTION, SOLUTION INTRAVENOUS CONTINUOUS
Status: DISCONTINUED | OUTPATIENT
Start: 2018-01-01 | End: 2018-01-01

## 2018-01-01 RX ORDER — CEFAZOLIN SODIUM 2 G/100ML
2 INJECTION, SOLUTION INTRAVENOUS
Status: DISCONTINUED | OUTPATIENT
Start: 2018-01-01 | End: 2018-01-01

## 2018-01-01 RX ORDER — HYDROMORPHONE HCL/0.9% NACL/PF 0.2MG/0.2
0.2 SYRINGE (ML) INTRAVENOUS
Status: DISCONTINUED | OUTPATIENT
Start: 2018-01-01 | End: 2018-01-01

## 2018-01-01 RX ORDER — CEFAZOLIN SODIUM 1 G/50ML
1250 SOLUTION INTRAVENOUS EVERY 12 HOURS
Status: DISCONTINUED | OUTPATIENT
Start: 2018-01-01 | End: 2018-01-01

## 2018-01-01 RX ORDER — MAGNESIUM SULFATE HEPTAHYDRATE 40 MG/ML
4 INJECTION, SOLUTION INTRAVENOUS EVERY 4 HOURS PRN
Status: DISCONTINUED | OUTPATIENT
Start: 2018-01-01 | End: 2018-01-01 | Stop reason: HOSPADM

## 2018-01-01 RX ORDER — FENTANYL CITRATE 50 UG/ML
INJECTION, SOLUTION INTRAMUSCULAR; INTRAVENOUS PRN
Status: DISCONTINUED | OUTPATIENT
Start: 2018-01-01 | End: 2018-01-01

## 2018-01-01 RX ORDER — ATROPINE SULFATE 0.1 MG/ML
INJECTION INTRAVENOUS
Status: DISCONTINUED
Start: 2018-01-01 | End: 2018-01-01 | Stop reason: HOSPADM

## 2018-01-01 RX ORDER — ONDANSETRON 2 MG/ML
INJECTION INTRAMUSCULAR; INTRAVENOUS PRN
Status: DISCONTINUED | OUTPATIENT
Start: 2018-01-01 | End: 2018-01-01

## 2018-01-01 RX ORDER — METOCLOPRAMIDE HYDROCHLORIDE 5 MG/ML
10 INJECTION INTRAMUSCULAR; INTRAVENOUS EVERY 6 HOURS
Status: DISCONTINUED | OUTPATIENT
Start: 2018-01-01 | End: 2018-01-01

## 2018-01-01 RX ORDER — ALBUTEROL SULFATE 90 UG/1
2 AEROSOL, METERED RESPIRATORY (INHALATION) EVERY 4 HOURS PRN
Status: DISCONTINUED | OUTPATIENT
Start: 2018-01-01 | End: 2018-01-01 | Stop reason: HOSPADM

## 2018-01-01 RX ORDER — METOCLOPRAMIDE HYDROCHLORIDE 5 MG/ML
10 INJECTION INTRAMUSCULAR; INTRAVENOUS EVERY 6 HOURS PRN
Status: DISCONTINUED | OUTPATIENT
Start: 2018-01-01 | End: 2018-01-01

## 2018-01-01 RX ORDER — POTASSIUM CHLORIDE 750 MG/1
20 TABLET, EXTENDED RELEASE ORAL 2 TIMES DAILY
Status: DISCONTINUED | OUTPATIENT
Start: 2018-01-01 | End: 2018-01-01 | Stop reason: HOSPADM

## 2018-01-01 RX ORDER — ALBUTEROL SULFATE 0.83 MG/ML
2.5 SOLUTION RESPIRATORY (INHALATION) ONCE
Status: COMPLETED | OUTPATIENT
Start: 2018-01-01 | End: 2018-01-01

## 2018-01-01 RX ORDER — SODIUM CHLORIDE FOR INHALATION 3 %
3 VIAL, NEBULIZER (ML) INHALATION 2 TIMES DAILY
Status: DISCONTINUED | OUTPATIENT
Start: 2018-01-01 | End: 2018-01-01 | Stop reason: HOSPADM

## 2018-01-01 RX ORDER — POTASSIUM CHLORIDE 7.45 MG/ML
10 INJECTION INTRAVENOUS
Status: DISCONTINUED | OUTPATIENT
Start: 2018-01-01 | End: 2018-01-01 | Stop reason: HOSPADM

## 2018-01-01 RX ORDER — MIDAZOLAM (PF) 1 MG/ML IN 0.9 % SODIUM CHLORIDE INTRAVENOUS SOLUTION
1-8 CONTINUOUS
Status: DISCONTINUED | OUTPATIENT
Start: 2018-01-01 | End: 2018-01-01

## 2018-01-01 RX ORDER — POTASSIUM CHLORIDE 1.5 G/1.58G
20-40 POWDER, FOR SOLUTION ORAL
Status: DISCONTINUED | OUTPATIENT
Start: 2018-01-01 | End: 2018-01-01

## 2018-01-01 RX ORDER — POTASSIUM CHLORIDE 750 MG/1
20-40 TABLET, EXTENDED RELEASE ORAL
Status: DISCONTINUED | OUTPATIENT
Start: 2018-01-01 | End: 2018-01-01 | Stop reason: HOSPADM

## 2018-01-01 RX ORDER — OXYCODONE HYDROCHLORIDE 5 MG/1
5-10 TABLET ORAL EVERY 4 HOURS PRN
Status: DISCONTINUED | OUTPATIENT
Start: 2018-01-01 | End: 2018-01-01 | Stop reason: HOSPADM

## 2018-01-01 RX ORDER — SODIUM CHLORIDE 9 MG/ML
INJECTION, SOLUTION INTRAVENOUS
Status: COMPLETED
Start: 2018-01-01 | End: 2018-01-01

## 2018-01-01 RX ORDER — IOPAMIDOL 755 MG/ML
77 INJECTION, SOLUTION INTRAVASCULAR ONCE
Status: COMPLETED | OUTPATIENT
Start: 2018-01-01 | End: 2018-01-01

## 2018-01-01 RX ORDER — ACETAMINOPHEN 325 MG/1
325 TABLET ORAL EVERY 4 HOURS
Status: DISCONTINUED | OUTPATIENT
Start: 2018-01-01 | End: 2018-01-01

## 2018-01-01 RX ORDER — POTASSIUM CHLORIDE 750 MG/1
20-40 TABLET, EXTENDED RELEASE ORAL
Status: DISCONTINUED | OUTPATIENT
Start: 2018-01-01 | End: 2018-01-01

## 2018-01-01 RX ORDER — FENTANYL CITRATE 50 UG/ML
25-50 INJECTION, SOLUTION INTRAMUSCULAR; INTRAVENOUS
Status: DISCONTINUED | OUTPATIENT
Start: 2018-01-01 | End: 2018-01-01 | Stop reason: HOSPADM

## 2018-01-01 RX ORDER — ALUMINA, MAGNESIA, AND SIMETHICONE 2400; 2400; 240 MG/30ML; MG/30ML; MG/30ML
30 SUSPENSION ORAL EVERY 4 HOURS PRN
Status: DISCONTINUED | OUTPATIENT
Start: 2018-01-01 | End: 2018-01-01 | Stop reason: HOSPADM

## 2018-01-01 RX ORDER — LIDOCAINE 40 MG/G
CREAM TOPICAL
Status: DISCONTINUED | OUTPATIENT
Start: 2018-01-01 | End: 2018-01-01 | Stop reason: HOSPADM

## 2018-01-01 RX ORDER — ACETAMINOPHEN 325 MG/1
975 TABLET ORAL
Status: DISCONTINUED | OUTPATIENT
Start: 2018-01-01 | End: 2018-01-01 | Stop reason: HOSPADM

## 2018-01-01 RX ORDER — CEFAZOLIN SODIUM 1 G/3ML
1 INJECTION, POWDER, FOR SOLUTION INTRAMUSCULAR; INTRAVENOUS SEE ADMIN INSTRUCTIONS
Status: DISCONTINUED | OUTPATIENT
Start: 2018-01-01 | End: 2018-01-01

## 2018-01-01 RX ORDER — POTASSIUM CHLORIDE 29.8 MG/ML
20 INJECTION INTRAVENOUS
Status: DISCONTINUED | OUTPATIENT
Start: 2018-01-01 | End: 2018-01-01 | Stop reason: HOSPADM

## 2018-01-01 RX ORDER — ACETAMINOPHEN 325 MG/1
650 TABLET ORAL EVERY 4 HOURS
Status: DISCONTINUED | OUTPATIENT
Start: 2018-01-01 | End: 2018-01-01

## 2018-01-01 RX ADMIN — SODIUM CHLORIDE 500 ML: 9 INJECTION, SOLUTION INTRAVENOUS at 20:09

## 2018-01-01 RX ADMIN — ALBUTEROL SULFATE 2.5 MG: 2.5 SOLUTION RESPIRATORY (INHALATION) at 15:37

## 2018-01-01 RX ADMIN — Medication 0.5 MG: at 05:45

## 2018-01-01 RX ADMIN — OXYCODONE HYDROCHLORIDE 10 MG: 5 TABLET ORAL at 04:01

## 2018-01-01 RX ADMIN — Medication 0.2 MG: at 13:12

## 2018-01-01 RX ADMIN — ACETAMINOPHEN 325 MG: 325 TABLET, FILM COATED ORAL at 15:26

## 2018-01-01 RX ADMIN — IPRATROPIUM BROMIDE AND ALBUTEROL SULFATE 3 ML: .5; 3 SOLUTION RESPIRATORY (INHALATION) at 15:36

## 2018-01-01 RX ADMIN — IPRATROPIUM BROMIDE AND ALBUTEROL SULFATE 3 ML: .5; 3 SOLUTION RESPIRATORY (INHALATION) at 16:22

## 2018-01-01 RX ADMIN — PIPERACILLIN SODIUM,TAZOBACTAM SODIUM 4.5 G: 4; .5 INJECTION, POWDER, FOR SOLUTION INTRAVENOUS at 08:53

## 2018-01-01 RX ADMIN — ACETAMINOPHEN 650 MG: 325 TABLET, FILM COATED ORAL at 08:36

## 2018-01-01 RX ADMIN — PROPOFOL 30 MCG/KG/MIN: 10 INJECTION, EMULSION INTRAVENOUS at 20:31

## 2018-01-01 RX ADMIN — OMEPRAZOLE 20 MG: 20 CAPSULE, DELAYED RELEASE ORAL at 11:31

## 2018-01-01 RX ADMIN — SODIUM CHLORIDE: 9 INJECTION, SOLUTION INTRAVENOUS at 00:47

## 2018-01-01 RX ADMIN — VANCOMYCIN HYDROCHLORIDE 1250 MG: 10 INJECTION, POWDER, LYOPHILIZED, FOR SOLUTION INTRAVENOUS at 16:13

## 2018-01-01 RX ADMIN — ACETAMINOPHEN 975 MG: 325 TABLET, FILM COATED ORAL at 18:39

## 2018-01-01 RX ADMIN — IPRATROPIUM BROMIDE AND ALBUTEROL SULFATE 3 ML: .5; 3 SOLUTION RESPIRATORY (INHALATION) at 12:34

## 2018-01-01 RX ADMIN — SODIUM CHLORIDE 500 ML: 9 INJECTION, SOLUTION INTRAVENOUS at 15:05

## 2018-01-01 RX ADMIN — OXYCODONE HYDROCHLORIDE 10 MG: 5 TABLET ORAL at 08:03

## 2018-01-01 RX ADMIN — FENTANYL CITRATE 50 MCG: 50 INJECTION, SOLUTION INTRAMUSCULAR; INTRAVENOUS at 08:06

## 2018-01-01 RX ADMIN — ACETAMINOPHEN 325 MG: 325 TABLET, FILM COATED ORAL at 14:10

## 2018-01-01 RX ADMIN — FLUTICASONE PROPIONATE AND SALMETEROL 1 PUFF: 50; 500 POWDER RESPIRATORY (INHALATION) at 20:23

## 2018-01-01 RX ADMIN — PROPOFOL: 10 INJECTION, EMULSION INTRAVENOUS at 08:59

## 2018-01-01 RX ADMIN — OMEPRAZOLE 20 MG: 20 CAPSULE, DELAYED RELEASE ORAL at 08:14

## 2018-01-01 RX ADMIN — PROCHLORPERAZINE EDISYLATE 5 MG: 5 INJECTION INTRAMUSCULAR; INTRAVENOUS at 02:56

## 2018-01-01 RX ADMIN — SODIUM CHLORIDE, POTASSIUM CHLORIDE, SODIUM LACTATE AND CALCIUM CHLORIDE: 600; 310; 30; 20 INJECTION, SOLUTION INTRAVENOUS at 16:22

## 2018-01-01 RX ADMIN — OXYCODONE HYDROCHLORIDE 5 MG: 5 TABLET ORAL at 11:11

## 2018-01-01 RX ADMIN — ONDANSETRON HYDROCHLORIDE 4 MG: 2 INJECTION, SOLUTION INTRAMUSCULAR; INTRAVENOUS at 07:21

## 2018-01-01 RX ADMIN — POTASSIUM PHOSPHATE, MONOBASIC AND POTASSIUM PHOSPHATE, DIBASIC 15 MMOL: 224; 236 INJECTION, SOLUTION INTRAVENOUS at 14:14

## 2018-01-01 RX ADMIN — PANTOPRAZOLE SODIUM 40 MG: 40 INJECTION, POWDER, FOR SOLUTION INTRAVENOUS at 07:44

## 2018-01-01 RX ADMIN — IPRATROPIUM BROMIDE AND ALBUTEROL SULFATE 3 ML: .5; 3 SOLUTION RESPIRATORY (INHALATION) at 16:44

## 2018-01-01 RX ADMIN — IPRATROPIUM BROMIDE AND ALBUTEROL SULFATE 3 ML: .5; 3 SOLUTION RESPIRATORY (INHALATION) at 07:52

## 2018-01-01 RX ADMIN — SODIUM CHLORIDE 500 ML: 9 INJECTION, SOLUTION INTRAVENOUS at 15:42

## 2018-01-01 RX ADMIN — FENTANYL CITRATE 50 MCG: 50 INJECTION, SOLUTION INTRAMUSCULAR; INTRAVENOUS at 08:47

## 2018-01-01 RX ADMIN — Medication 1 MG: at 16:13

## 2018-01-01 RX ADMIN — OXYCODONE HYDROCHLORIDE 10 MG: 5 TABLET ORAL at 16:05

## 2018-01-01 RX ADMIN — SUGAMMADEX 200 MG: 100 INJECTION, SOLUTION INTRAVENOUS at 09:35

## 2018-01-01 RX ADMIN — ONDANSETRON HYDROCHLORIDE 4 MG: 2 INJECTION, SOLUTION INTRAMUSCULAR; INTRAVENOUS at 11:17

## 2018-01-01 RX ADMIN — OXYCODONE HYDROCHLORIDE 10 MG: 5 TABLET ORAL at 11:30

## 2018-01-01 RX ADMIN — POTASSIUM CHLORIDE 20 MEQ: 750 TABLET, EXTENDED RELEASE ORAL at 20:22

## 2018-01-01 RX ADMIN — CAFFEINE 200 MG: 200 TABLET ORAL at 08:14

## 2018-01-01 RX ADMIN — IPRATROPIUM BROMIDE AND ALBUTEROL SULFATE 3 ML: .5; 3 SOLUTION RESPIRATORY (INHALATION) at 12:55

## 2018-01-01 RX ADMIN — SODIUM CHLORIDE SOLN NEBU 3% 3 ML: 3 NEBU SOLN at 07:31

## 2018-01-01 RX ADMIN — OXYCODONE HYDROCHLORIDE 5 MG: 5 TABLET ORAL at 20:25

## 2018-01-01 RX ADMIN — IPRATROPIUM BROMIDE AND ALBUTEROL SULFATE 3 ML: .5; 3 SOLUTION RESPIRATORY (INHALATION) at 21:36

## 2018-01-01 RX ADMIN — POTASSIUM CHLORIDE 20 MEQ: 400 INJECTION, SOLUTION INTRAVENOUS at 06:50

## 2018-01-01 RX ADMIN — Medication 0.3 MG: at 19:02

## 2018-01-01 RX ADMIN — FENTANYL CITRATE 25 MCG: 50 INJECTION INTRAMUSCULAR; INTRAVENOUS at 07:45

## 2018-01-01 RX ADMIN — FUROSEMIDE 20 MG: 10 INJECTION, SOLUTION INTRAVENOUS at 14:35

## 2018-01-01 RX ADMIN — OXYCODONE HYDROCHLORIDE 30 MG: 10 TABLET, FILM COATED, EXTENDED RELEASE ORAL at 17:51

## 2018-01-01 RX ADMIN — ACETAMINOPHEN 325 MG: 325 TABLET, FILM COATED ORAL at 22:24

## 2018-01-01 RX ADMIN — ESCITALOPRAM OXALATE 10 MG: 10 TABLET ORAL at 08:14

## 2018-01-01 RX ADMIN — FLUTICASONE PROPIONATE AND SALMETEROL 1 PUFF: 50; 500 POWDER RESPIRATORY (INHALATION) at 08:13

## 2018-01-01 RX ADMIN — OXYCODONE HYDROCHLORIDE 10 MG: 5 TABLET ORAL at 00:00

## 2018-01-01 RX ADMIN — PIPERACILLIN SODIUM,TAZOBACTAM SODIUM 4.5 G: 4; .5 INJECTION, POWDER, FOR SOLUTION INTRAVENOUS at 21:10

## 2018-01-01 RX ADMIN — ENOXAPARIN SODIUM 40 MG: 100 INJECTION SUBCUTANEOUS at 15:05

## 2018-01-01 RX ADMIN — ACETAMINOPHEN 325 MG: 325 TABLET, FILM COATED ORAL at 17:36

## 2018-01-01 RX ADMIN — POTASSIUM CHLORIDE 20 MEQ: 400 INJECTION, SOLUTION INTRAVENOUS at 12:42

## 2018-01-01 RX ADMIN — PROPOFOL 40 MG: 10 INJECTION, EMULSION INTRAVENOUS at 08:10

## 2018-01-01 RX ADMIN — PANTOPRAZOLE SODIUM 40 MG: 40 TABLET, DELAYED RELEASE ORAL at 08:36

## 2018-01-01 RX ADMIN — Medication 1 MG: at 16:45

## 2018-01-01 RX ADMIN — IPRATROPIUM BROMIDE AND ALBUTEROL SULFATE 3 ML: .5; 3 SOLUTION RESPIRATORY (INHALATION) at 11:39

## 2018-01-01 RX ADMIN — SODIUM CHLORIDE SOLN NEBU 3% 3 ML: 3 NEBU SOLN at 07:53

## 2018-01-01 RX ADMIN — SODIUM CHLORIDE SOLN NEBU 3% 3 ML: 3 NEBU SOLN at 07:28

## 2018-01-01 RX ADMIN — ACETAMINOPHEN 325 MG: 325 TABLET, FILM COATED ORAL at 01:37

## 2018-01-01 RX ADMIN — SODIUM CHLORIDE SOLN NEBU 3% 3 ML: 3 NEBU SOLN at 08:53

## 2018-01-01 RX ADMIN — OXYCODONE HYDROCHLORIDE 30 MG: 10 TABLET, FILM COATED, EXTENDED RELEASE ORAL at 05:42

## 2018-01-01 RX ADMIN — ACETAMINOPHEN 325 MG: 325 TABLET, FILM COATED ORAL at 17:52

## 2018-01-01 RX ADMIN — TOPIRAMATE 50 MG: 50 TABLET ORAL at 11:26

## 2018-01-01 RX ADMIN — FENTANYL CITRATE 50 MCG: 50 INJECTION, SOLUTION INTRAMUSCULAR; INTRAVENOUS at 08:09

## 2018-01-01 RX ADMIN — TRAZODONE HYDROCHLORIDE 50 MG: 50 TABLET ORAL at 22:05

## 2018-01-01 RX ADMIN — ACETAMINOPHEN 325 MG: 325 TABLET, FILM COATED ORAL at 05:32

## 2018-01-01 RX ADMIN — FENTANYL CITRATE 50 MCG: 50 INJECTION INTRAMUSCULAR; INTRAVENOUS at 11:49

## 2018-01-01 RX ADMIN — OXYCODONE HYDROCHLORIDE 10 MG: 5 TABLET ORAL at 05:02

## 2018-01-01 RX ADMIN — ACETAMINOPHEN 325 MG: 325 TABLET, FILM COATED ORAL at 05:42

## 2018-01-01 RX ADMIN — CAFFEINE 200 MG: 200 TABLET ORAL at 08:37

## 2018-01-01 RX ADMIN — ESCITALOPRAM OXALATE 10 MG: 10 TABLET ORAL at 11:26

## 2018-01-01 RX ADMIN — IPRATROPIUM BROMIDE AND ALBUTEROL SULFATE 3 ML: .5; 3 SOLUTION RESPIRATORY (INHALATION) at 10:58

## 2018-01-01 RX ADMIN — IPRATROPIUM BROMIDE AND ALBUTEROL SULFATE 3 ML: .5; 3 SOLUTION RESPIRATORY (INHALATION) at 20:40

## 2018-01-01 RX ADMIN — POTASSIUM CHLORIDE 20 MEQ: 400 INJECTION, SOLUTION INTRAVENOUS at 08:44

## 2018-01-01 RX ADMIN — IPRATROPIUM BROMIDE AND ALBUTEROL SULFATE 3 ML: .5; 3 SOLUTION RESPIRATORY (INHALATION) at 11:51

## 2018-01-01 RX ADMIN — SODIUM CHLORIDE SOLN NEBU 3% 3 ML: 3 NEBU SOLN at 21:37

## 2018-01-01 RX ADMIN — OXYCODONE HYDROCHLORIDE 5 MG: 5 TABLET ORAL at 14:10

## 2018-01-01 RX ADMIN — SODIUM CHLORIDE: 9 INJECTION, SOLUTION INTRAVENOUS at 20:30

## 2018-01-01 RX ADMIN — OXYCODONE HYDROCHLORIDE 30 MG: 10 TABLET, FILM COATED, EXTENDED RELEASE ORAL at 05:32

## 2018-01-01 RX ADMIN — LEVOTHYROXINE SODIUM 150 MCG: 75 TABLET ORAL at 08:14

## 2018-01-01 RX ADMIN — ACETAMINOPHEN 975 MG: 325 TABLET, FILM COATED ORAL at 15:43

## 2018-01-01 RX ADMIN — IPRATROPIUM BROMIDE AND ALBUTEROL SULFATE 3 ML: .5; 3 SOLUTION RESPIRATORY (INHALATION) at 08:52

## 2018-01-01 RX ADMIN — TOPIRAMATE 50 MG: 50 TABLET ORAL at 08:14

## 2018-01-01 RX ADMIN — IPRATROPIUM BROMIDE AND ALBUTEROL SULFATE 3 ML: .5; 3 SOLUTION RESPIRATORY (INHALATION) at 12:20

## 2018-01-01 RX ADMIN — IPRATROPIUM BROMIDE AND ALBUTEROL SULFATE 3 ML: .5; 3 SOLUTION RESPIRATORY (INHALATION) at 07:31

## 2018-01-01 RX ADMIN — SODIUM CHLORIDE, POTASSIUM CHLORIDE, SODIUM LACTATE AND CALCIUM CHLORIDE: 600; 310; 30; 20 INJECTION, SOLUTION INTRAVENOUS at 08:32

## 2018-01-01 RX ADMIN — PROPOFOL 100 MG: 10 INJECTION, EMULSION INTRAVENOUS at 19:15

## 2018-01-01 RX ADMIN — SODIUM CHLORIDE, POTASSIUM CHLORIDE, SODIUM LACTATE AND CALCIUM CHLORIDE: 600; 310; 30; 20 INJECTION, SOLUTION INTRAVENOUS at 08:08

## 2018-01-01 RX ADMIN — ROCURONIUM BROMIDE 20 MG: 10 INJECTION INTRAVENOUS at 08:58

## 2018-01-01 RX ADMIN — HUMAN ALBUMIN MICROSPHERES AND PERFLUTREN 6 ML: 10; .22 INJECTION, SOLUTION INTRAVENOUS at 11:15

## 2018-01-01 RX ADMIN — ONDANSETRON HYDROCHLORIDE 4 MG: 2 INJECTION, SOLUTION INTRAMUSCULAR; INTRAVENOUS at 01:44

## 2018-01-01 RX ADMIN — IPRATROPIUM BROMIDE AND ALBUTEROL SULFATE 3 ML: .5; 3 SOLUTION RESPIRATORY (INHALATION) at 20:49

## 2018-01-01 RX ADMIN — PIPERACILLIN SODIUM,TAZOBACTAM SODIUM 4.5 G: 4; .5 INJECTION, POWDER, FOR SOLUTION INTRAVENOUS at 22:19

## 2018-01-01 RX ADMIN — Medication 100 MG: at 19:15

## 2018-01-01 RX ADMIN — PROCHLORPERAZINE EDISYLATE 5 MG: 5 INJECTION INTRAMUSCULAR; INTRAVENOUS at 12:07

## 2018-01-01 RX ADMIN — SODIUM CHLORIDE SOLN NEBU 3% 3 ML: 3 NEBU SOLN at 16:23

## 2018-01-01 RX ADMIN — IPRATROPIUM BROMIDE AND ALBUTEROL SULFATE 3 ML: .5; 3 SOLUTION RESPIRATORY (INHALATION) at 19:40

## 2018-01-01 RX ADMIN — LEVOTHYROXINE SODIUM 150 MCG: 75 TABLET ORAL at 11:31

## 2018-01-01 RX ADMIN — ACETAMINOPHEN 975 MG: 325 TABLET, FILM COATED ORAL at 23:03

## 2018-01-01 RX ADMIN — SODIUM CHLORIDE SOLN NEBU 3% 3 ML: 3 NEBU SOLN at 20:40

## 2018-01-01 RX ADMIN — METHYLPREDNISOLONE 125 MG: 125 INJECTION, POWDER, LYOPHILIZED, FOR SOLUTION INTRAMUSCULAR; INTRAVENOUS at 20:50

## 2018-01-01 RX ADMIN — Medication 1 MG/HR: at 00:25

## 2018-01-01 RX ADMIN — PROCHLORPERAZINE EDISYLATE 5 MG: 5 INJECTION INTRAMUSCULAR; INTRAVENOUS at 08:46

## 2018-01-01 RX ADMIN — ATROPINE SULFATE 0.4 MG: 0.1 INJECTION PARENTERAL at 06:15

## 2018-01-01 RX ADMIN — IOPAMIDOL 77 ML: 755 INJECTION, SOLUTION INTRAVENOUS at 04:37

## 2018-01-01 RX ADMIN — POTASSIUM CHLORIDE 40 MEQ: 750 TABLET, EXTENDED RELEASE ORAL at 18:19

## 2018-01-01 RX ADMIN — ENOXAPARIN SODIUM 40 MG: 100 INJECTION SUBCUTANEOUS at 15:26

## 2018-01-01 RX ADMIN — SODIUM CHLORIDE, PRESERVATIVE FREE 76 ML: 5 INJECTION INTRAVENOUS at 04:37

## 2018-01-01 RX ADMIN — SODIUM CHLORIDE, PRESERVATIVE FREE 5 ML: 5 INJECTION INTRAVENOUS at 14:18

## 2018-01-01 RX ADMIN — ROCURONIUM BROMIDE 20 MG: 10 INJECTION INTRAVENOUS at 08:31

## 2018-01-01 RX ADMIN — ONDANSETRON HYDROCHLORIDE 4 MG: 2 INJECTION, SOLUTION INTRAMUSCULAR; INTRAVENOUS at 01:37

## 2018-01-01 RX ADMIN — ONDANSETRON 4 MG: 2 INJECTION INTRAMUSCULAR; INTRAVENOUS at 09:30

## 2018-01-01 RX ADMIN — PANTOPRAZOLE SODIUM 40 MG: 40 INJECTION, POWDER, FOR SOLUTION INTRAVENOUS at 08:03

## 2018-01-01 RX ADMIN — IPRATROPIUM BROMIDE AND ALBUTEROL SULFATE 3 ML: .5; 3 SOLUTION RESPIRATORY (INHALATION) at 07:33

## 2018-01-01 RX ADMIN — ACETAMINOPHEN 325 MG: 325 TABLET, FILM COATED ORAL at 10:14

## 2018-01-01 RX ADMIN — Medication 0.5 MG: at 22:36

## 2018-01-01 RX ADMIN — POTASSIUM CHLORIDE 20 MEQ: 750 TABLET, EXTENDED RELEASE ORAL at 08:14

## 2018-01-01 RX ADMIN — OXYCODONE HYDROCHLORIDE 10 MG: 5 TABLET ORAL at 19:49

## 2018-01-01 RX ADMIN — CAFFEINE 200 MG: 200 TABLET ORAL at 10:14

## 2018-01-01 RX ADMIN — IPRATROPIUM BROMIDE AND ALBUTEROL SULFATE 3 ML: .5; 3 SOLUTION RESPIRATORY (INHALATION) at 07:26

## 2018-01-01 RX ADMIN — SODIUM CHLORIDE, POTASSIUM CHLORIDE, SODIUM LACTATE AND CALCIUM CHLORIDE: 600; 310; 30; 20 INJECTION, SOLUTION INTRAVENOUS at 00:23

## 2018-01-01 RX ADMIN — POTASSIUM PHOSPHATE, MONOBASIC AND POTASSIUM PHOSPHATE, DIBASIC 15 MMOL: 224; 236 INJECTION, SOLUTION INTRAVENOUS at 06:56

## 2018-01-01 RX ADMIN — PIPERACILLIN SODIUM,TAZOBACTAM SODIUM 4.5 G: 4; .5 INJECTION, POWDER, FOR SOLUTION INTRAVENOUS at 03:13

## 2018-01-01 RX ADMIN — ENOXAPARIN SODIUM 40 MG: 100 INJECTION SUBCUTANEOUS at 14:13

## 2018-01-01 RX ADMIN — PROPOFOL 175 MCG/KG/MIN: 10 INJECTION, EMULSION INTRAVENOUS at 08:08

## 2018-01-01 RX ADMIN — ACETAMINOPHEN 325 MG: 325 TABLET, FILM COATED ORAL at 21:28

## 2018-01-01 RX ADMIN — SODIUM CHLORIDE SOLN NEBU 3% 3 ML: 3 NEBU SOLN at 19:41

## 2018-01-01 RX ADMIN — ONDANSETRON HYDROCHLORIDE 4 MG: 2 INJECTION, SOLUTION INTRAMUSCULAR; INTRAVENOUS at 11:24

## 2018-01-01 RX ADMIN — ENOXAPARIN SODIUM 40 MG: 100 INJECTION SUBCUTANEOUS at 14:27

## 2018-01-01 RX ADMIN — PIPERACILLIN SODIUM,TAZOBACTAM SODIUM 4.5 G: 4; .5 INJECTION, POWDER, FOR SOLUTION INTRAVENOUS at 15:00

## 2018-01-01 RX ADMIN — Medication 0.2 MG: at 14:20

## 2018-01-01 RX ADMIN — ONDANSETRON HYDROCHLORIDE 4 MG: 2 INJECTION, SOLUTION INTRAMUSCULAR; INTRAVENOUS at 02:56

## 2018-01-01 RX ADMIN — NALOXONE HYDROCHLORIDE 0.4 MG: 0.4 INJECTION, SOLUTION INTRAMUSCULAR; INTRAVENOUS; SUBCUTANEOUS at 15:24

## 2018-01-01 RX ADMIN — ROCURONIUM BROMIDE 30 MG: 10 INJECTION INTRAVENOUS at 08:13

## 2018-01-01 RX ADMIN — FENTANYL CITRATE 50 MCG/HR: 50 INJECTION, SOLUTION INTRAMUSCULAR; INTRAVENOUS at 20:27

## 2018-01-01 RX ADMIN — TOPIRAMATE 100 MG: 50 TABLET ORAL at 22:06

## 2018-01-01 RX ADMIN — PANTOPRAZOLE SODIUM 40 MG: 40 INJECTION, POWDER, FOR SOLUTION INTRAVENOUS at 20:48

## 2018-01-01 RX ADMIN — PROCHLORPERAZINE EDISYLATE 5 MG: 5 INJECTION INTRAMUSCULAR; INTRAVENOUS at 23:03

## 2018-01-01 RX ADMIN — OXYCODONE HYDROCHLORIDE 10 MG: 5 TABLET ORAL at 12:07

## 2018-01-01 RX ADMIN — OXYCODONE HYDROCHLORIDE 30 MG: 10 TABLET, FILM COATED, EXTENDED RELEASE ORAL at 17:36

## 2018-01-01 RX ADMIN — ONDANSETRON HYDROCHLORIDE 4 MG: 2 INJECTION, SOLUTION INTRAMUSCULAR; INTRAVENOUS at 13:42

## 2018-01-01 RX ADMIN — VANCOMYCIN HYDROCHLORIDE 1750 MG: 10 INJECTION, POWDER, LYOPHILIZED, FOR SOLUTION INTRAVENOUS at 21:34

## 2018-01-01 RX ADMIN — POTASSIUM CHLORIDE 40 MEQ: 750 TABLET, EXTENDED RELEASE ORAL at 08:03

## 2018-01-01 RX ADMIN — PIPERACILLIN SODIUM,TAZOBACTAM SODIUM 4.5 G: 4; .5 INJECTION, POWDER, FOR SOLUTION INTRAVENOUS at 03:39

## 2018-01-01 RX ADMIN — MIDAZOLAM 2 MG: 1 INJECTION INTRAMUSCULAR; INTRAVENOUS at 20:00

## 2018-01-01 RX ADMIN — MIDAZOLAM 2 MG: 1 INJECTION INTRAMUSCULAR; INTRAVENOUS at 08:06

## 2018-01-01 RX ADMIN — FENTANYL CITRATE 50 MCG: 50 INJECTION INTRAMUSCULAR; INTRAVENOUS at 20:25

## 2018-01-01 ASSESSMENT — ACTIVITIES OF DAILY LIVING (ADL)
ADLS_ACUITY_SCORE: 11
AMBULATION: 0-->INDEPENDENT
ADLS_ACUITY_SCORE: 16
ADLS_ACUITY_SCORE: 11
RETIRED_COMMUNICATION: 0-->UNDERSTANDS/COMMUNICATES WITHOUT DIFFICULTY
ADLS_ACUITY_SCORE: 11
ADLS_ACUITY_SCORE: 11
ADLS_ACUITY_SCORE: 15
ADLS_ACUITY_SCORE: 11
ADLS_ACUITY_SCORE: 16
ADLS_ACUITY_SCORE: 11
ADLS_ACUITY_SCORE: 16
COGNITION: 0 - NO COGNITION ISSUES REPORTED
FALL_HISTORY_WITHIN_LAST_SIX_MONTHS: NO
PREVIOUS_RESPONSIBILITIES: MEAL PREP;HOUSEKEEPING;LAUNDRY;MEDICATION MANAGEMENT;FINANCES;DRIVING
ADLS_ACUITY_SCORE: 16
ADLS_ACUITY_SCORE: 11
ADLS_ACUITY_SCORE: 16
ADLS_ACUITY_SCORE: 11
ADLS_ACUITY_SCORE: 16
ADLS_ACUITY_SCORE: 11
TRANSFERRING: 0-->INDEPENDENT
ADLS_ACUITY_SCORE: 11
DRESS: 0-->INDEPENDENT
SWALLOWING: 0-->SWALLOWS FOODS/LIQUIDS WITHOUT DIFFICULTY
ADLS_ACUITY_SCORE: 16
ADLS_ACUITY_SCORE: 11
BATHING: 0-->INDEPENDENT
ADLS_ACUITY_SCORE: 11
TOILETING: 0-->INDEPENDENT
ADLS_ACUITY_SCORE: 11
ADLS_ACUITY_SCORE: 16
RETIRED_EATING: 0-->INDEPENDENT
ADLS_ACUITY_SCORE: 16
ADLS_ACUITY_SCORE: 11

## 2018-07-25 PROBLEM — J96.91 RESPIRATORY FAILURE WITH HYPOXIA (H): Status: ACTIVE | Noted: 2018-01-01

## 2018-07-25 NOTE — PROGRESS NOTES
Brief IP Note     Plan tumor debulking and stent placement tomorrow at 810am in OR  -Please send type and cross today in addition to basic labs and coags  -Please make NPO after midnight     Yared Swanson MD   of Medicine  Interventional Pulmonary  Department of Pulmonary, Allergy, Critical Care and Sleep Medicine   [facility], [facility]  Pager: [phone]   Office: [phone]  [email or web address]

## 2018-07-25 NOTE — PROGRESS NOTES
Pt arrived via ems [facility] at 1653. RR 34-40 labored, loud insp/exsp wheezing. Moonlighter [phone] paged and not to see patient yet. Fellow paged and stated moonlighter to cover the patient at this time. Moonlighter [phone] paged again with no response.     1744 patient is feeling SOB on BIPAP, asking for nebs. Abdominal muscles use in breathing. Calm but having trouble breathing.     No orders at this time

## 2018-07-25 NOTE — IP AVS SNAPSHOT
[address] [address]    Phone:  [phone]                                       After Visit Summary   7/25/2018    Rose Santos    MRN: [id number]           After Visit Summary Signature Page     I have received my discharge instructions, and my questions have been answered. I have discussed any challenges I see with this plan with the nurse or doctor.    ..........................................................................................................................................  Patient/Patient Representative Signature      ..........................................................................................................................................  Patient Representative Print Name and Relationship to Patient    ..................................................               ................................................  Date                                            Time    ..........................................................................................................................................  Reviewed by Signature/Title    ...................................................              ..............................................  Date                                                            Time

## 2018-07-25 NOTE — PROGRESS NOTES
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    Stability of Patient: Patient is vitally stable, with no critical labs, and will likely remain stable throughout the transfer process    Expected Time of Arrival for Transfer: 8-24 hours    Recommendations for Management and Stabilization: Not needed    Additional Comments Patient bronched by Ely Tsang at [facility] yesterday and found to have obstructing tumor. Dr. Swanson here agreed to transfer for procedure (stent).    Ki Chao MD

## 2018-07-25 NOTE — PROGRESS NOTES
BRIEF SUMMARY OF TRANSFER DATA    52 F with history of SCC of R lung with known recurrence involving the R mainstem bronchus. She presented to [facility] with shortness of breath requiring BiPAP support. Bronchoscopy revealed mass obstructing R mainstem bronchus, suspected to be tumor. Pulmonary service at [facility] contacted Dr. Swanson, who arranged for transfer to [facility] for consideration of tumor debulking and/or stenting.     Vitals immediately prior to transfer:  37 C, 77, 32, 117/94, 96%.       PMH  Oncologic history: Squamous cell carcinoma of R lung  -  s/p RLL and treatment with cisplatin and etoposide.  - XRT for mediastinal recurrentce in 2014.   - Recurrence in R mainstem bronchus in 10/2017. A bronchoscopy on 7/12/2018 showed necrotizing granulomas and fungal hyphae c/w aspergillus.   - She was initially treated with voriconazole, but this was held due to dyspnea that occurred with administration.       Discharged on:  methylprednisolone 125 mg IV daily  Albuterol/ipratrop  oxycontin 30 mg bid  Oxycodone-acetaminophen 7.5/325 mg  Topiramate    Voriconazole was stopped on admission to [facility].

## 2018-07-25 NOTE — IP AVS SNAPSHOT
MRN:[id number]                      After Visit Summary   7/25/2018    Rose Santos    MRN: [id number]           Thank you!     Thank you for choosing [facility] for your care. Our goal is always to provide you with excellent care. Hearing back from our patients is one way we can continue to improve our services. Please take a few minutes to complete the written survey that you may receive in the mail after you visit with us. Thank you!        Patient Information     Date Of Birth          1965        Designated Caregiver       Most Recent Value    Caregiver    Will someone help with your care after discharge? yes    Name of designated caregiver     Phone number of caregiver home    Caregiver address home      About your hospital stay     You were admitted on:  July 25, 2018 You last received care in the:  [address] [facility]    You were discharged on:  July 30, 2018        Reason for your hospital stay       Transferred to [facility] 7/25 with acute hypercapnic respiratory failure requiring intubation 7/25 likely due to right and left mainstem bronchi infiltrations vs. mass effect s/p Tissue debulking 7/26 by Pulmonary and returned to ICU-> extubated without respiratory difficulty.  In the floor issues with Sinus bradycardia, somnolence. Improved after holding Oxycontin, resumed home meds including topamax. Lexapro.    Procedure(s):07.26.18  Flexible and Rigid Bronchoscopy with Stent Placement, Tumor Debulking using Cryoablation and Argon Plasma Coagulation, Bronchial Washing.      Per Pulmonary:     -->  3% saline neb twice daily  -->  Aerobika twice daily  -->  Airway examination in 4-6 weeks                  Who to Call     For medical emergencies, please call 911.  For non-urgent questions about your medical care, please call your primary care provider or clinic, [phone]  For questions related to your surgery, please call your surgery clinic        Attending Provider      Provider Specialty    Ki Chao MD Internal Medicine    Van Hylton MD Internal Medicine       Primary Care Provider Office Phone # Fax #    Asia Ly [phone]      After Care Instructions     Activity       Your activity upon discharge: activity as tolerated            Diet       Follow this diet upon discharge: Orders Placed This Encounter      Advance Diet as Tolerated: Regular Diet Adult            Oxygen Adult       Renew Home Oxygen Order  Renew previous prescription.  Expected treatment length is indefinite (99 months).    Attending Provider: Caden Corrales  Physician signature: See electronic signature associated with these discharge orders  Date of Order: July 30, 2018                  Follow-up Appointments     Adult [facility] Follow-up and recommended labs and tests       Follow up with primary care provider, Asia Ly, within 7 days to evaluate after surgery and for hospital follow- up.  The following labs/tests are recommended: cbc, bmp, mg.    Follow up with Pulmonary team in 3-4 weeks or as instructed.     Follow up with Oncology team as scheduled.     Appointments on [facility] and/or [facility] (with [facility] or [facility] provider or service). Call [phone] if you haven't heard regarding these appointments within 7 days of discharge.                  Further instructions from your care team       Pulmonary Recommendations:     --> Successful flexible, rigid bronchoscopy, airway dilation, stent placement in LM, therapeutic suctioning, and tumor debulking.   -->  3% saline neb twice daily  -->  Aerobika twice daily  -->  Airway examination in 4-6 weeks     Follow up with rad onc and primary oncologist.    >> Hold Oxycontin for now. Follow up with your pain team for further recommendations and pain management.   >> Increase topamax 100 mg twice daily x 2 days and then go back to your prior to admission dosing.           Additional Information      "If you use hormonal birth control (such as the pill, patch, ring or implants): You'll need a second form of birth control for 7 days (condoms, a diaphragm or contraceptive foam). While in the hospital, you received a medicine called Bridion. Your normal birth control will not work as well for a week after taking this medicine.          Pending Results     Date and Time Order Name Status Description    2018 1429 EKG 12-lead, complete Preliminary     2018 0844 Surgical pathology exam In process             Statement of Approval     Ordered          18 1406  I have reviewed and agree with all the recommendations and orders detailed in this document.  EFFECTIVE NOW     Approved and electronically signed by:  Caden Corrales MD             Admission Information     Date & Time Provider Department Dept. Phone    2018 Van Hylton MD [address] [facility] [place] [phone]      Your Vitals Were     Blood Pressure Pulse Temperature Respirations Height Weight    120/74 (BP Location: Left arm) 72 98  F (36.7  C) (Oral) 20 1.575 m (5' 2\") 66.7 kg (147 lb 0.8 oz)    Pulse Oximetry BMI (Body Mass Index)                99% 26.9 kg/m2          MyChart Information     [facility] lets you send messages to your doctor, view your test results, renew your prescriptions, schedule appointments and more. To sign up, go to [email or web address] . Click on \"Log in\" on the left side of the screen, which will take you to the Welcome page. Then click on \"Sign up Now\" on the right side of the page.     You will be asked to enter the access code listed below, as well as some personal information. Please follow the directions to create your username and password.     Your access code is: [email or web address]  Expires: 10/28/2018  2:12 PM     Your access code will  in 90 days. If you need help or a new code, please call your [facility] clinic or [phone].        Care EveryWhere ID     This is your Care EveryWhere ID. This " could be used by other organizations to access your [facility] medical records  [id number]        Equal Access to Services     OSEAS MARSH : Hadii ger Silver, vianey long, chiquitamello sellerstienjo ann sharp, sharif krameradalgisamarco tan. So [facility] [phone].    ATENCIÓN: Si habla español, tiene a juan disposición servicios gratuitos de asistencia lingüística. Llame al [phone].    We comply with applicable federal civil rights laws and [place] laws. We do not discriminate on the basis of race, color, national origin, age, disability, sex, sexual orientation, or gender identity.               Review of your medicines      START taking        Dose / Directions    sodium chloride 3 % Nebu neb solution        Dose:  3 mL   Take 3 mLs by nebulization 2 times daily   Quantity:  84 mL   Refills:  1         CONTINUE these medicines which may have CHANGED, or have new prescriptions. If we are uncertain of the size of tablets/capsules you have at home, strength may be listed as something that might have changed.        Dose / Directions    OXYCODONE HCL PO   This may have changed:  Another medication with the same name was removed. Continue taking this medication, and follow the directions you see here.        Dose:  5 mg   Take 5 mg by mouth every 4 hours as needed (Pain) Max of 6 tabs per day   Refills:  0       TOPIRAMATE PO   This may have changed:  Another medication with the same name was removed. Continue taking this medication, and follow the directions you see here.        Dose:  50 mg   Take 50 mg by mouth Take 150mg daily in AM, and 200mg daily at bedtime   Refills:  0         CONTINUE these medicines which have NOT CHANGED        Dose / Directions    albuterol 108 (90 Base) MCG/ACT Inhaler   Commonly known as:  PROAIR HFA/PROVENTIL HFA/VENTOLIN HFA        Dose:  2 puff   Inhale 2 puffs into the lungs every 4 hours as needed for shortness of breath / dyspnea or wheezing   Refills:  0        COMPAZINE PO        Dose:  10 mg   Take 10 mg by mouth every 6 hours as needed for nausea   Refills:  0       ESCITALOPRAM OXALATE PO        Dose:  10 mg   Take 10 mg by mouth daily   Refills:  0       fluticasone-salmeterol 500-50 MCG/DOSE diskus inhaler   Commonly known as:  ADVAIR        Dose:  1 puff   Inhale 1 puff into the lungs 2 times daily   Refills:  0       ipratropium - albuterol 0.5 mg/2.5 mg/3 mL 0.5-2.5 (3) MG/3ML neb solution   Commonly known as:  DUONEB        Dose:  1 vial   Take 1 vial by nebulization every 6 hours as needed for shortness of breath / dyspnea or wheezing   Refills:  0       LEVOTHYROXINE SODIUM PO        Dose:  150 mcg   Take 150 mcg by mouth   Refills:  0       naloxone nasal spray   Commonly known as:  NARCAN        Dose:  4 mg   Spray 4 mg into one nostril alternating nostrils as needed for opioid reversal every 2-3 minutes until assistance arrives   Refills:  0       OMEPRAZOLE PO        Dose:  20 mg   Take 20 mg by mouth every morning   Refills:  0       polyethylene glycol Packet   Commonly known as:  MIRALAX/GLYCOLAX        Dose:  1 packet   Take 1 packet by mouth 2 times daily as needed for constipation   Refills:  0       potassium chloride ana er   Commonly known as:  K-DUR        Dose:  20 mEq   Take 20 mEq by mouth 2 times daily   Refills:  0       TRAZODONE HCL PO        Dose:  50 mg   Take 50 mg by mouth At Bedtime   Refills:  0            Where to get your medicines      These medications were sent to [facility] [place], [place] - [address]  [address] [address]     Phone:  [phone]     sodium chloride 3 % Nebu neb solution                Protect others around you: Learn how to safely use, store and throw away your medicines at www.disposemymeds.org.        Information about OPIOIDS     PRESCRIPTION OPIOIDS: WHAT YOU NEED TO KNOW   We gave you an opioid (narcotic) pain medicine. It is important to manage your  pain, but opioids are not always the best choice. You should first try all the other options your care team gave you. Take this medicine for as short a time (and as few doses) as possible.     These medicines have risks:    DO NOT drive when on new or higher doses of pain medicine. These medicines can affect your alertness and reaction times, and you could be arrested for driving under the influence (DUI). If you need to use opioids long-term, talk to your care team about driving.    DO NOT operate heave machinery    DO NOT do any other dangerous activities while taking these medicines.     DO NOT drink any alcohol while taking these medicines.      If the opioid prescribed includes acetaminophen, DO NOT take with any other medicines that contain acetaminophen. Read all labels carefully. Look for the word  acetaminophen  or  Tylenol.  Ask your pharmacist if you have questions or are unsure.    You can get addicted to pain medicines, especially if you have a history of addiction (chemical, alcohol or substance dependence). Talk to your care team about ways to reduce this risk.    Store your pills in a secure place, locked if possible. We will not replace any lost or stolen medicine. If you don t finish your medicine, please throw away (dispose) as directed by your pharmacist. The [place] Pollution Control Agency has more information about safe disposal: https://www.pca.[facility].mn.us/living-green/managing-unwanted-medications.     All opioids tend to cause constipation. Drink plenty of water and eat foods that have a lot of fiber, such as fruits, vegetables, prune juice, apple juice and high-fiber cereal. Take a laxative (Miralax, milk of magnesia, Colace, Senna) if you don t move your bowels at least every other day.              Medication List: This is a list of all your medications and when to take them. Check marks below indicate your daily home schedule. Keep this list as a reference.      Medications            Morning Afternoon Evening Bedtime As Needed    albuterol 108 (90 Base) MCG/ACT Inhaler   Commonly known as:  PROAIR HFA/PROVENTIL HFA/VENTOLIN HFA   Inhale 2 puffs into the lungs every 4 hours as needed for shortness of breath / dyspnea or wheezing                                COMPAZINE PO   Take 10 mg by mouth every 6 hours as needed for nausea                                ESCITALOPRAM OXALATE PO   Take 10 mg by mouth daily   Last time this was given:  10 mg on 7/30/2018  8:14 AM   Next Dose Due:  7/31 7/31                       fluticasone-salmeterol 500-50 MCG/DOSE diskus inhaler   Commonly known as:  ADVAIR   Inhale 1 puff into the lungs 2 times daily   Last time this was given:  1 puff on 7/30/2018  8:13 AM   Next Dose Due:  7/30 PM                    7/30               ipratropium - albuterol 0.5 mg/2.5 mg/3 mL 0.5-2.5 (3) MG/3ML neb solution   Commonly known as:  DUONEB   Take 1 vial by nebulization every 6 hours as needed for shortness of breath / dyspnea or wheezing   Last time this was given:  3 mLs on 7/30/2018 11:51 AM                                LEVOTHYROXINE SODIUM PO   Take 150 mcg by mouth   Last time this was given:  150 mcg on 7/30/2018  8:14 AM   Next Dose Due:  7/31 7/31                       naloxone nasal spray   Commonly known as:  NARCAN   Spray 4 mg into one nostril alternating nostrils as needed for opioid reversal every 2-3 minutes until assistance arrives                                OMEPRAZOLE PO   Take 20 mg by mouth every morning   Last time this was given:  20 mg on 7/30/2018  8:14 AM   Next Dose Due:  7/31 7/31                       OXYCODONE HCL PO   Take 5 mg by mouth every 4 hours as needed (Pain) Max of 6 tabs per day   Last time this was given:  10 mg on 7/30/2018 11:30 AM   Next Dose Due:  May take after 3:30PM                                polyethylene glycol Packet   Commonly known as:  MIRALAX/GLYCOLAX   Take 1 packet by mouth 2  times daily as needed for constipation                                potassium chloride ana er   Commonly known as:  K-DUR   Take 20 mEq by mouth 2 times daily   Last time this was given:  20 mEq on 7/30/2018  8:14 AM   Next Dose Due:  7/30 PM                    7/30               sodium chloride 3 % Nebu neb solution   Take 3 mLs by nebulization 2 times daily   Last time this was given:  3 mLs on 7/30/2018  7:53 AM   Next Dose Due:  7/30 PM                        7/30           TOPIRAMATE PO   Take 50 mg by mouth Take 150mg daily in AM, and 200mg daily at bedtime   Last time this was given:  50 mg on 7/30/2018  8:14 AM   Next Dose Due:  7/30 at bedtime                        7/30           TRAZODONE HCL PO   Take 50 mg by mouth At Bedtime   Last time this was given:  50 mg on 7/29/2018 10:05 PM   Next Dose Due:  7/30 bedtime                        7/30

## 2018-07-26 NOTE — OR NURSING
Patient arrived to PACU tachipnic RR 28-30, weak cough unable to bring anything up and using intercostal muscles with breathing.

## 2018-07-26 NOTE — ANESTHESIA CARE TRANSFER NOTE
Patient: Rose Santos    Procedure(s):  Flexible and Rigid Bronchoscopy with Stent Placement, Tumor Debulking using Cryoablation and Argon Plasma Coagulation, Bronchial Washing - Wound Class: II-Clean Contaminated   - Wound Class: II-Clean Contaminated    Diagnosis: Lung Cancer   Diagnosis Additional Information: No value filed.    Anesthesia Type:   General, ETT     Note:  Airway :Face Mask  Patient transferred to:PACU  Comments: Awake with good patent airway.  VSSHandoff Report: Identifed the Patient, Identified the Reponsible Provider, Reviewed the pertinent medical history, Discussed the surgical course, Reviewed Intra-OP anesthesia mangement and issues during anesthesia, Set expectations for post-procedure period and Allowed opportunity for questions and acknowledgement of understanding      Vitals: (Last set prior to Anesthesia Care Transfer)    CRNA VITALS  7/26/2018 0919 - 7/26/2018 0953      7/26/2018             Pulse: 91    SpO2: 98 %                Electronically Signed By: PARK Monet CRNA  July 26, 2018  9:53 AM

## 2018-07-26 NOTE — ANESTHESIA POSTPROCEDURE EVALUATION
Patient: Rose Santos    Procedure(s):  Flexible and Rigid Bronchoscopy with Stent Placement, Tumor Debulking using Cryoablation and Argon Plasma Coagulation, Bronchial Washing - Wound Class: II-Clean Contaminated   - Wound Class: II-Clean Contaminated    Diagnosis:Lung Cancer   Diagnosis Additional Information: No value filed.    Anesthesia Type:  General, ETT    Note:  Anesthesia Post Evaluation    Patient location during evaluation: PACU  Patient participation: Able to fully participate in evaluation  Level of consciousness: awake  Pain management: adequate  Airway patency: stridor  Cardiovascular status: acceptable  Respiratory status: acceptable  Hydration status: acceptable  PONV: controlled       Comments: Stridulous in PACU liley due to fixed right and left mainstem obstruction, see OP note. Duoneb in PACU        Last vitals:  Vitals:    07/26/18 0645 07/26/18 0700 07/26/18 0800   BP: 121/71 112/69 105/62   Pulse:      Resp:   18   Temp:   36.8  C (98.2  F)   SpO2: 100% 100% 100%         Electronically Signed By: Debbie Sierra MD  July 26, 2018  10:21 AM

## 2018-07-26 NOTE — PROGRESS NOTES
1900 code blue called for intubation with respiratory failure. Pt transferred to , report given bedside to MELODY Lepe.

## 2018-07-26 NOTE — PLAN OF CARE
Problem: Patient Care Overview  Goal: Plan of Care/Patient Progress Review  Outcome: Improving  D/I/A: Pt on 4A Neuro/Surgical ICU s/p balloon bronchoplasty with stent placement, biopsies, and tissue/tumor debulking.  PMH includes: SCC and non-small cell carcinoma, s/p radiation, RML and RLL lobectomies, depression, chronic pain, COPD, and HLD.    Neuro: Intact, generalized weakness  CV: NSR, no ectopy   Resp: LS coarse throughout, 2LPM via NC.  Aggressive pulmonary toilet encouraged.    GI: Bowel sounds active x4, regular diet with fair PO.    : [place] in place with adequate UOP.    Skin: Port to R chest, PIV x1 to LFA.    Gtts: Phos replacement currently infusing, NS at 100/hr  Plan: Continue to monitor and notify MD with changes.

## 2018-07-26 NOTE — PROGRESS NOTES
Responded to Code Blue call. Code team was called due to respiratory failure. Patient was on CPAP/BiPAP 12/5 cmH2O, 100%, RR 12/min, SpO2 96%. Respiratory interventions included ABG, suctioning, intubation. Patient was transferred to ICU for further monitoring. ABG Ph 7.21 Co2 62 PaO2 237 HCO3 25    Uzma Wen, RT  7/25/2018 7:49 PM

## 2018-07-26 NOTE — ANESTHESIA PREPROCEDURE EVALUATION
Anesthesia Evaluation    PAC Discussion and Assessment    ASA Classification:   Case is suitable for:   Anesthetic techniques and relevant risks discussed:   Invasive monitoring and risk discussed:   Types:   Possibility and Risk of blood transfusion discussed:   NPO instructions given:   Additional anesthetic preparation and risks discussed:   Needs early admission to pre-op area:   Other:     PAC Resident/NP Anesthesia Assessment:        Mid-Level Provider/Resident:   Date:   Time:     Attending Anesthesiologist Anesthesia Assessment:  Rose Santos is a 53yo female with significant PMH of COPD and squamous cell lung carcinoma T2N1M0 s/p RLL and RML resection 08/2013 and chemotherapy (cisplatin and Navelbine); with mediastinal recurrence in 2014 treated with chemotherapy (cisplatin and etoposide) and radiation in 11/2014. She was in a 3-month trial with PD-L1 medication c/b pneumonitis requiring prolonged steroid taper. In late 2017, she developed cough and wheezing and was found to have recurrent squamous cell carcinoma in right mainstem bronchus treated with radiation in 10/2017. In June 2018, she developed worsening dyspnea and wheezing. Outpatient bronchoscopy 7/12 demonstrated necrotizing granulomas and septate fungal hyphae (aspergillus-like). She was treated with voriconazole.      She was admitted to OSH 7/23 with concern for worsening wheezing and dyspnea after taking voriconazole. Bronchoscopy 7/24 showed significant mass on right mainstem bronchus and probable tumor infiltrating left mainstem bronchus. Biopsy at OSH showed majority of squamous cell lung cancer with superficial necrotizing granuloma. Her respiratory status declined and requiring BiPAP. She was transferred from [facility] to [facility] 7/25 for evaluation for tumor debulking and stent placement.      Anesthesiologist: Debbie Sierra MD  Date:   Time:   Pass/Fail:   Disposition:     PAC Pharmacist Assessment:        Pharmacist:   Date:    Time:      Physical Exam  Normal systems: cardiovascular    Airway   Comment: ett in situ    Dental     Cardiovascular       Pulmonary           Anesthesia Plan      History & Physical Review  History and physical reviewed and following examination; no interval change.    ASA Status:  4 .    NPO Status:  > 6 hours    Plan for General and ETT (ETT in situ) with Intravenous induction. Maintenance will be TIVA.           Postoperative Care      Consents

## 2018-07-26 NOTE — H&P
MICU Admission  History & Physical  Rose Santos MRN: [id number]  Age: 52 year old, : 1965  Date of Admission:2018  Primary care provider: No primary care provider on file.          Chief Complaint  Acute hypercapnic respiratory failure         History of Present Illness  Rose Santos is a 51yo female with significant PMH of COPD and squamous cell lung carcinoma T2N1M0 s/p RLL and RML resection 2013 and chemotherapy (cisplatin and Navelbine); with mediastinal recurrence in  treated with chemotherapy (cisplatin and etoposide) and radiation in 2014. She was in a 3-month trial with PD-L1 medication c/b pneumonitis requiring prolonged steroid taper. In late , she developed cough and wheezing and was found to have recurrent squamous cell carcinoma in right mainstem bronchus treated with radiation in 10/2017. In 2018, she developed worsening dyspnea and wheezing. Outpatient bronchoscopy  demonstrated necrotizing granulomas and septate fungal hyphae (aspergillus-like). She was treated with voriconazole.     She was admitted to OSH  with concern for worsening wheezing and dyspnea after taking voriconazole. Bronchoscopy  showed significant mass on right mainstem bronchus and probable tumor infiltrating left mainstem bronchus. Biopsy at OSH showed majority of squamous cell lung cancer with superficial necrotizing granuloma. Her respiratory status declined and requiring BiPAP. She was transferred from [facility] to [facility]  for evaluation for tumor debulking and stent placement.     Unable to obtain a history from the patient due to intubation. Medical history and HPI per chart/Care Everywhere review.     ROS: Unable to obtain from patient due to intubation.        Past Medical History  Per chart review:  - Squamous cell carcinoma dx   - COPD  - Major depression  - Chronic pain syndrome  - Hyperlipidemia  - Hypothyroidism  - PFO dx on  echocardiogram 2013        Past Surgical History  Per chart review:  - RML, RLL wedge resection 8/2013  - Endometrial ablation  - Salpino-oophorectomy        Social History  Per chart review:    and has 15yo son.  Tobacco: former smoker, 45 pack-year, quit in 2008       Family History  Per chart review:  Father: colon cancer, prostate cancer  Mother: HTN, thyroid disorder  Maternal grandmother: stomach cancer  Sister: breast cancer        Allergies  NKDA        Medications  Per chart review:  - Oxycodone 30mg q12h  - Percocet q4h PRN  - Topiramate 150mg qday  - Trazodone 50mg at bedtime  - Albuterol inhaler  - Ellipta inhaler  - Ipratropium-albutero nebulizer   - Levothyroxine 150mcg qday  - Omeprazole 20mg qday        Vitals  Temp:  [97.8  F (36.6  C)-98.4  F (36.9  C)] 98.4  F (36.9  C)  Pulse:  [] 100  Heart Rate:  [99] 99  Resp:  [22-44] 22  BP: (107-110)/(75-83) 110/75  FiO2 (%):  [100 %] 100 %  SpO2:  [99 %-100 %] 99 %        Ventilator  Ventilation Mode: CMV/AC  (Continuous Mandatory Ventilation/ Assist Control)  FiO2 (%): 100 %  Rate Set (breaths/minute): 16 breaths/min  Tidal Volume Set (mL): 400 mL  PEEP (cm H2O): 5 cmH2O  Oxygen Concentration (%): 100 %  Resp: 22           Physical Exam    GEN: Intubated, responds to voice.   HEENT: AT/ NC, PERRL b/l,  sclera anicteric, no conjunctival injection. No cervical swelling or LAD.  PULM/THORAX: Coarse breath sounds, diminished in RLL, expiratory wheezing throughout, no rales/rhonchi.  CV: RRR, S1 and S2 appreciated, no extra heart sounds, murmurs or rub auscultated.  ABD: Soft, nontender, nondistended. Normoactive bowel sounds, no HSM appreciated.  EXT: No LE edema, clubbing or cyanosis. No tenderness. Radial and pedal pulses palpable.  SKIN: No apparent rashes, skin lesions. No jaundice.  NEURO: Alert, on propofol gtt. Follows commands (squeezes fingers and wiggles toes), able to nod and shake head to questions. Pupils 5-6mm, round, reactive.  Bilat  strength 5/5. Bilateral patellar, brachioradialis reflexes 2+.         ROUTINE ICU LABS (Last four results)    CMP  Recent Labs  Lab 07/25/18 1926   *   POTASSIUM 3.6   CHLORIDE 114*   CO2 20   ANIONGAP 12   *   BUN 10   CR 0.88   GFRESTIMATED 67   GFRESTBLACK 81   RADHA 8.6   PROTTOTAL 7.0   ALBUMIN 3.1*   BILITOTAL 0.2   ALKPHOS 64   AST 29   ALT 33     CBC  Recent Labs  Lab 07/25/18 1926   WBC 22.1*   RBC 4.50   HGB 12.3   HCT 39.6   MCV 88   MCH 27.3   MCHC 31.1*   RDW 15.2*          INRNo lab results found in last 7 days.    Arterial Blood Gas  Recent Labs  Lab 07/25/18  1856   PH 7.21*   PCO2 62*   PO2 237*   HCO3 25   O2PER 60.0             Imaging  CXR 7/25: official read pending  CT chest 7/25: ordered           Assessment and Plan  Rose Santos is a 53yo female with PMH of COPD and squamous cell lung carcinoma s/p RUL and RML lobectomy 2013 and chemotherapy; with multiple recurrences requiring chemotherapy and radiation therapy. Transferred to [facility] 7/25 with acute hypercapnic respiratory failure likely due to right and left mainstem bronchi infiltrations vs. mass effect, intubated 7/25 for respiratory distress.    Neurologic  Sedation: Propofol on admission, but had MAPS <65 mmHg; stopped propofol gtt and MAPs returned >70 mmHg. MAP goal >65 mmHg  - Versed gtt    Analgesia:   - Fentanyl gtt  - Hydromorphone PRN    Cardiovascular  Hemodynamically stable, EKG 7/25 sinus tachy with RA enlargement. Echo 8/2017 EF 65%; h/o positive bubble study 3/24/15.  - Tele monitoring  MAP goal: >65 mmHg    Pulmonary  #Acute hypercapnic respiratory failure: Intubated 7/25. Possibly multifactorial with right and left mainstem bronchi infiltrations, COPD, pulmonary infection. Tumor infiltrations are likely the major cause of respiratory failure. However, has history of COPD and wheezing on physical exam, which is likely contributing. Elevated WBCs on admission. Pulmonary infection is less  likely since she has been afebrile and CXR 7/26 stable.  - Scheduled for OR 7/26 for tumor debulking and stent placement (Dr. Yared Swanson)  - Chest CT ordered 7/25  - Consider rad-onc consult after OR; has received high doses of radiation and not likely a candidate for additional treatment, but could consult to determine if additional treatment is possible  - Consider oncology consult after OR  - Methylprednisolone ONCE   - Duoneb q6h    Gastrointestinal  No acute issues    Infectious Disease  Elevated WBC to 22.1, afebrile. Elevated WBC likely due to acute respiratory failure. Treating with broad-spectrum abx.  - Daily CBC    Antibiotics:   - Zosyn IV q6h  - Vancomcyin IV q12h    Cultures: none.    Hematology  Bright red blood with suctioning after intubation. Hgb 10.9 (from 12.3). Likely trauma-induced, but will continue to monitor Hgb.  - q4h Hgb    Endocrine  No acute issues.    Renal/Electolytes/FEN  #Respiratory acidosis: Elevated CO2 on most recent ABG after intubated. Will increase respiration rate.  - ABG in AM prior to OR    Creatinine: 0.88 (baseline: 0.8-1)    Skin/MSK/Rheum:  No acute issues    PPX:   - VTE: SCDs, hold enoxaparin in preparation for OR tomorrow.  - GI: Protonix  FEN: NPO for OR tomorrow  Tubes/Lines/Drains: LUE PIV, Port-a-cath, ETT, Aguillon catheter.     CODE: Full  Dispo: ICU cares overnight, plan for OR tomorrow 7/26.    Patient seen and evaluated with attending physician Dr. Hardeep Jacobsen, who agrees with the assessment and plan.    Anitha Clinton, MS4    Resident/Fellow Attestation   I, Pedro Polk, was present with the medical student who participated in the service and in the documentation of the note.  I have verified the history and personally performed the physical exam and medical decision making.  I agree with the assessment and plan of care as documented in the note.  The patient was seen and discussed with Dr. Hardeep Jacobsen, who agrees with the assessment and plan.    Pedro JAIME  MD Jam  Internal Medicine PGY-2  Pager [phone]  Date of Service (when I saw the patient): 07/26/18

## 2018-07-26 NOTE — PROGRESS NOTES
Brief Interventional Pulmonology Note    Patient transferred to MICU for accelerated dyspnea requiring intubation and mechanical ventilation for WOB.     Brief oncology history includes:    8/2013-Seen by oncologist (Dr. Suzy Limon) for RLL mass with NSCLC (SCC, T2N1M0) s/p RML and RLL lobectomy and mediastinoscopy by Dr. Espana.     11/2014 - Mediastinal recurrence s/p 6000 cGy of radiation in 30 fractions by Dr. Flores with concurrent cisplatin+etoposide. Placed on PACIFIC trial and developed pneumonitis from adjuvant PD-L1 agent treated with 1yr of prednisone and supplemental O2.     10/2017-Developed dyspnea and found to have RM obstructing mass which was bx proven SCC. PET CT showed activity in right hilar node and RLLL pulmonary nodule. She was not a surgical candidate and received additional 4500 cGy in 25 fractions to the subcarinal area.     1/2018. Overall dyspnea was improved. However in 6/2018, she had increased dyspnea. Recently admitted to [facility] for resp sx and now found to have bilateral mainstem disease. She is now transferred to [facility] for possible debulking and stent placement.     Assessment and Recommendations  1. Acute respiratory failure 2/2 advanced NSCLC with recurrence, now with bilateral mainstem disease. Will plan for OR in am for tumor debulking and stent placement to facilitate extubation. Unfortunately, I don't see any images pushed over; therefore, would recommend getting CT chest with IV contrast tonight to help define tumor burden, airway/lung anatomy and proximity to major vessels. She has already received 6000+4500 cGy and not likely a candidate for further radiation; however, would get rad-onc input if they would even consider any more radiation. Please consult oncology for input as well.  - Supportive care tonight   - Please obtain type and cross in addition to coags  - Hold subcutaneous anticoagulation for tonight and in the am   - CT chest with IV contrast  tonight.   - Plan OR in am    Appreciate ICU efforts.     Yared Swanson MD   of Medicine  Interventional Pulmonary  Department of Pulmonary, Allergy, Critical Care and Sleep Medicine   [facility]  Pager: [phone]   Office: [phone]  Email: [email or web address]    Melvina SCHNEIDER, OCN  Clinical Nurse Specialist  Department of Thoracic Surgery  Office: [phone]  Email: [email or web address]    Queenie Carrillo  Interventional Pulmonology Surgery Scheduler  Office: [phone]  Email: [email or web address]

## 2018-07-26 NOTE — PROGRESS NOTES
"MICU Progress note     Interval events: Patient stable on ventilator overnight. This AM noting that she is uncomfortable and in pain from ETT. She is awake and oriented. Also notes back pain. She and  and son are eager/nervous for her procedure.     Exam  /71  Pulse 100  Temp (P) 98.2  F (36.8  C) (Oral)  Resp (P) 18  Ht 1.575 m (5' 2\")  Wt 71.6 kg (157 lb 13.6 oz)  SpO2 100%  BMI 28.87 kg/m2     Gen: Alert and calm in bed, intubated, NAD   HEENT: ETT. No jaundice or icteric pupils   Pulm: Mechanically ventilated sounds. Aeration is better on left than right  CV: Nml s1, s2   Abd: Soft NTND   Extremities: warm, strong pulses, no peripheral edema     Assessment and Plan  Rose aSntos is a 53yo female with PMH of COPD and squamous cell lung carcinoma s/p RUL and RML lobectomy 2013 and chemotherapy; with multiple recurrences requiring chemotherapy and radiation therapy. Transferred to [facility] 7/25 with acute hypercapnic respiratory failure likely due to right and left mainstem bronchi infiltrations vs. mass effect, intubated 7/25 for respiratory distress.   Changes on 7/26  To OR for tumor debulking and bronchial stent     Neurologic  Sedation:   - Versed gtt     Analgesia:   - Fentanyl gtt  - Hydromorphone PRN     Cardiovascular  Hemodynamically stable, EKG 7/25 sinus tachy with RA enlargement. Echo 8/2017 EF 65%; h/o positive bubble study 3/24/15.  - Tele monitoring  MAP goal: >65 mmHg     Pulmonary  #Acute hypercapnic respiratory failure: Intubated 7/25. Possibly multifactorial with right and left mainstem bronchi infiltrations, COPD, pulmonary infection. Tumor infiltrations are likely the major cause of respiratory failure. However, has history of COPD and wheezing on physical exam, which is likely contributing. Elevated WBCs on admission. Pulmonary infection is less likely since she has been afebrile and CXR 7/26 stable.  -  OR 7/26 for tumor debulking and stent placement (Dr. Yared Swanson)  - " Consider oncology consult after OR  - s/p Methylprednisolone ONCE   - Duoneb q6h     Gastrointestinal  No acute issues     Infectious Disease  Leukocytosis - improved  - Daily CBC     Antibiotics for broad spectrum coverage in setting of critical illness  - Zosyn IV q6h  - Vancomcyin IV q12h  - will reassess after return from OR, likely not necessary.   - pan culture on return from OR      Cultures: none.     Hematology  Bright red blood with suctioning after intubation. Hgb 10.9 (from 12.3). Likely trauma-induced, but will continue to monitor Hgb.  - CBC after OR      Endocrine  No acute issues.     Renal/Electolytes/FEN  #Respiratory acidosis: - improved. Tumor obstruction likely causing CO2 retention.      Creatinine: 0.88 (baseline: 0.8-1)     Skin/MSK/Rheum:  No acute issues     PPX:   - VTE: SCDs, hold enoxaparin in preparation for OR tomorrow.  - GI: Protonix  FEN: NPO for OR tomorrow  Tubes/Lines/Drains: LUE PIV, Port-a-cath, ETT, Aguillon catheter.      CODE: Full  Dispo: ICU     Charisse Saini MD  Internal Medicine - Pediatrics PGY3  P: [phone]       Attending note:  Patient seen, examined and discussed with the Resident physicians. All data reviewed including vital signs, laboratory studies, medications and imaging. The patient remains critically ill with acute respiratory failure, metastatic squamous cell carcinoma, proximal airway obstruction.  To OR today for debridement and stent placement for bilateral mainstem bronchus occlusion.      Total Critical Care time 40 minutes      Lois Abdi MD  [phone]

## 2018-07-26 NOTE — ANESTHESIA PROCEDURE NOTES
"  ANESTHESIOLOGY RESIDENT/CRNA INTUBATION NOTE  Indication for intubation: respiratory insufficiency, airway protection.  Provider Ordering Intubation: JEWELL JEFFREY  History regarding the most recent potassium obtained: Yes  History regarding renal failure obtained: Yes  History of presence or absence of CVA/stroke was obtained: Yes  History of presence or absence of NM disorder obtained: Yes  Post Intubation:  ETT secured, Vent settings by primary/ICU team, Report given to primary nurse and/or team, No apparent complications, Primary/ICU team to review CXR and Sedation to be ordered by primary/ICU team  \"Code Blue\" called to patient's room.  Patient intubated with RSI, grade I view, ETT placed without problem.  Positive ETCO2, positive BBS.  ICU team at bedside to sedate patient, order CXR and transfer patient to ICU        "

## 2018-07-26 NOTE — PROCEDURES
INTERVENTIONAL PULMONOLOGY       Procedure(s):    A flexible and rigid bronchoscopy   Airway exam  Balloon bronchoplasty with stent placement (1 sites)   Endobronchial forcep biopsies (1 sites)  Therapeutic suctioning (1 sites)  Tissue/tumor debulking with APC and cryoprobe recanalization     Indication:  Carinal mass in patient with history of lung cancer, RLL and RMLobectomy.    Attending of Record:  Yared Swanson MD     Interventional Pulmonary Fellow   Luis Whitman MD     Trainees Present:   None    Medications:    General Anesthesia - See anesthesia flowsheet for details    Sedation Time:   Per Anesthesia Care Provider    Time Out:  Performed    The patient's medical record has been reviewed.  The indication for the procedure was reviewed.  The necessary history and physical examination was performed and reviewed.  The risks, benefits and alternatives of the procedure were discussed with the the patient's representative (her ) in detail and he had the opportunity to ask questions.  I discussed in particular the potential complications including risks of minor or life-threatening bleeding and/or infection, respiratory failure, vocal cord trauma / paralysis, pneumothorax, and discomfort. Sedation risks were also discussed including abnormal heart rhythms, low blood pressure, and respiratory failure. All questions were answered to the best of my ability.  Verbal and written informed consent was obtained.  The proposed procedure and the patient's identification were verified prior to the procedure by the physician and the surgical team.    The patient was assessed for the adequacy for the procedure and to receive medications.   Mental Status:  Sedated  Airway examination:  Endotracheally intubated  Pulmonary:  Decreased breathsound throughout  CV:  RRR, no murmurs or gallops  ASA Grade:  (IV)  Severe systemic disease that is a constant threat to life    After clinical evaluation and reviewing the  indication, risks, alternatives and benefits of the procedure the patient was deemed to be in satisfactory condition to undergo the procedure.           A Tuberculosis risk assessment was performed:  The patient has no known RISK of Tuberculosis    The procedure was performed in a negative airflow room: The patient could not be moved to a negative airflow room because of needed OR for the procedure    Maneuvers / Procedure:      A Flexible and 12mm Rigid bronchoscope bronchoscope was inserted through the mouth, the cords were anesthetized with lidocaine. Upper airway structures, vocal cords (anatomy and function) appear to be normal. The patient was intubated with the rigid bronchoscopy.   Airway dilation: Airway dilation#1: The 12-13.5-15mm Elation Balloon was used to dilate the Left mainstem  airway. Each dilation was held for 60sec and repeated 3 times  Airway Examination: A complete airway examination was performed from the distal trachea to the subsegmental level in each lobe of both lungs.  Pertinent findings include surgically absent RLL and RML. Widely patent RUL.  Carinal mass with extension into L main bronchus with 90% occlusion, primarily intrinsic mass.       Stent placement: Stent#1:Aero ([id number]) stent deployed in the left main bronchus using fluoroscopic/direct guidance. After deployment, the stent was in good position.   Therapeutic suctioning: 15-20min of operative time was spent clearing out the airway of debris, blood and mucous prior to the intervention.   Tumor/Tissue Debulking: The nicola and Left mainstem airway was accessed and tumor/tissue debulking was performed using the 7F ERBE APC straight probe, 2.4mm ERBE cryoprobe, Mechanical coring  and Mechanical forcep. Hemostasis and patency of the airway was acheived post-debulking.    Any disposable equipment was visually inspected and deemed to be intact immediately post procedure.      Relevant  Pictures    Pre-intervention      Post-debulking, dilation, and stent placement in LM              Recommendations:   --> Successful flexible, rigid bronchoscopy, airway dilation, stent placement in LM, therapeutic suctioning, and tumor debulking.   -->  3% saline neb twice daily  -->  Aerobika twice daily  -->  Airway examination in 4-6 weeks    Follow up with rad onc and primary oncologist.      Luis Whitman MD  Interventional Pulmonary  Department of Pulmonary, Allergy, Critical Care and Sleep Medicine   [facility]    I was present and supervised the entire portion of the procedure for patient Rose Santos. This includes the time-out for patient verification and procedure verification. I have reviewed the above report and agree with the findings, interpretation, and recommendations.     Yared Swanson MD   of Medicine  Interventional Pulmonary  Department of Pulmonary, Allergy, Critical Care and Sleep Medicine   [facility]  Pager: [phone]

## 2018-07-26 NOTE — OP NOTE
[facility] Brief Operative Note    Pre-operative diagnosis: Lung Cancer    Post-operative diagnosis * No post-op diagnosis entered *   Procedure: Procedure(s):  Flexible and Rigid Bronchoscopy with Stent Placement, Tumor Debulking using Cryoablation and Argon Plasma Coagulation, Bronchial Washing - Wound Class: II-Clean Contaminated   - Wound Class: II-Clean Contaminated   Surgeon: Luis Whitman MD, Yared Swanson MD       Estimated blood loss: Less than 10 ml    Specimens: Carinal mass   Findings: Widely patent RUL.  RML and RLL surgically absent.  Friable mass spanning nicola, occluding L main bronchus 90%.  Mass debulked with cryo/apc/mechanical.  L stent placed.  Patient extubated post procedure. 14x40 aero stent placed.

## 2018-07-26 NOTE — PLAN OF CARE
Problem: Patient Care Overview  Goal: Plan of Care/Patient Progress Review    [id number]: Admitted to  approx 1945 s/p Code Blue respiratory failure and intubation on 6B.   Neuro: Drowsy, opens eyes spontaneously. Follows commands, uses call light and communication board to make needs known. Moves all extremities. PERRLA +3. C/o throat pain from ETT--Fentanyl gtt infusing. Initially sedated with Propofol-->changed to Versed d/t hypotension. Versed gtt stopped approx 0300 d/t bradycardia, pt appears comfortably sedated with Fentanyl@75/hr. Restraints on for safety.   Card: SR-->sinus bradycardia this am approx 0300. MD notified and EKG completed. Lowest HR seen 41. PRN Atropine available for sustained HR<45. MAP goal>65. 500cc NS bolus given on arrival to . NS @100/hr.   Pulm: CMV 50%/18/400/5. Moderate red/bloody secretions from ETT; MD aware and stated d/t trauma from intubation. Trended Hgb overnight to monitor. Lungs coarse with expiratory wheezes. See results for ABGs.   GI: NPO. OGT inserted, to LIS with bile/brown output. BS+. No BM this shift.   : Aguillon inserted this shift. Adequate UOP.   Skin: Tattoos, scars. Otherwise intact.     Plan for possible tumor debulking and stent placement today. Will continue to monitor and notify team with updates.

## 2018-07-26 NOTE — PHARMACY-VANCOMYCIN DOSING SERVICE
Pharmacy Vancomycin Initial Note  Date of Service 2018  Patient's  1965  52 year old, female    Indication: Aspiration Pneumonia    Current estimated CrCl = Estimated Creatinine Clearance: 68.5 mL/min (based on Cr of 0.88).    Creatinine for last 3 days  2018:  7:26 PM Creatinine 0.88 mg/dL    Recent Vancomycin Level(s) for last 3 days  No results found for requested labs within last 72 hours.      Vancomycin IV Administrations (past 72 hours)      No vancomycin orders with administrations in past 72 hours.                Nephrotoxins and other renal medications (Future)    Start     Dose/Rate Route Frequency Ordered Stop    18 0900  vancomycin (VANCOCIN) 1,250 mg in sodium chloride 0.9 % 250 mL intermittent infusion      1,250 mg  over 90 Minutes Intravenous EVERY 12 HOURS 18  vancomycin (VANCOCIN) 1,750 mg in sodium chloride 0.9 % 500 mL intermittent infusion      1,750 mg  over 2 Hours Intravenous ONCE 18  piperacillin-tazobactam (ZOSYN) 4.5 g vial to attach to  mL bag      4.5 g  over 30 Minutes Intravenous EVERY 6 HOURS 18            Contrast Orders - past 72 hours     None                Plan:  1.  Load with vancomycin 1750mg IV x1 (24.4mg/kg using ABW = 71.6kg) followed by vancomycin  1250 mg IV q12h (17.5mg/kg).   2.  Goal Trough Level: 15-20 mg/L   3.  Pharmacy will check trough levels as appropriate in 1-3 Days.    4. Serum creatinine levels will be ordered daily for the first week of therapy and at least twice weekly for subsequent weeks.    5. Sylva method utilized to dose vancomycin therapy: Method 2    Maryellen Avila, PharmD

## 2018-07-27 PROBLEM — R27.0 ATAXIA: Status: ACTIVE | Noted: 2017-01-01

## 2018-07-27 PROBLEM — B44.1 PULMONARY ASPERGILLOSIS (H): Status: ACTIVE | Noted: 2018-01-01

## 2018-07-27 PROBLEM — Q21.12 PFO (PATENT FORAMEN OVALE): Status: ACTIVE | Noted: 2017-01-01

## 2018-07-27 NOTE — TELEPHONE ENCOUNTER
Called patient to schedule procedure with Dr. Yared Swanson there was no answer.  Left message with my direct line [phone].

## 2018-07-27 NOTE — PROGRESS NOTES
Progress note:     Rose Santos is a 53yo female with PMH of COPD and squamous cell lung carcinoma s/p RUL and RML lobectomy  and chemotherapy; with multiple recurrences requiring chemotherapy and radiation therapy. Transferred to [facility]  with acute hypercapnic respiratory failure requiring intubation  likely due to right and left mainstem bronchi infiltrations vs. mass effect. Tissue debulking  and returned extubated without respiratory difficulty.     PMH  Oncologic history: Squamous cell carcinoma of R lung  -  s/p RLL and treatment with cisplatin and etoposide.  - XRT for mediastinal recurrentce in .   - Recurrence in R mainstem bronchus in 10/2017. A bronchoscopy on 2018 showed necrotizing granulomas and fungal hyphae c/w aspergillus.   - She was initially treated with voriconazole, but this was held due to dyspnea that occurred with administration.        Patient coming out of MICU today.   Seen and evaluated.   Says overall feels better. Breathing better.     On exam:     B/P: 118/70, T: 98.4, P: 100, R: 26  Temp (24hrs), Av.3  F (36.8  C), Min:97.9  F (36.6  C), Max:99  F (37.2  C)    Gen: still appears to have labored breathing.   Respi: Coarse crackles bl lung.   Cvs: s1s2 regular.   Abd: soft NT ND   ext: distally wwf.   Neuro: Alert interactive     Reviewed labs, imaging, Meds.     A &P :     Acute respiratory failure 2/2 advanced NSCLC with recurrence, now with bilateral mainstem disease  S/p   Procedure(s):18  Flexible and Rigid Bronchoscopy with Stent Placement, Tumor Debulking using Cryoablation and Argon Plasma Coagulation, Bronchial Washing - Wound Class: II-Clean Contaminated   - Wound Class: II-Clean Contaminated    Plan:   - Ct to monitor closely respiratory status, ct pulse ox.   - Ct current nebs- duonebs, saline nebs. aerobika.   - aggressive pulm toileting  - fu Pulmonary recs, call prn.   - fu Onc recs.   - Oxygen   - FU leucocytosis. Monitor off abx.   -  fu CUltures.     # Others as ICU team    Tf to medical floor today.   Medicine will be primary,  Sticky notes updated.       Caden Corrales MD   Hospitalist (Internal Medicine)  [facility]  Pager: [phone].

## 2018-07-27 NOTE — PROGRESS NOTES
07/27/18 1300   Quick Adds   Type of Visit Initial Occupational Therapy Evaluation   Living Environment   Lives With spouse   Living Arrangements house   Home Accessibility stairs to enter home   Number of Stairs to Enter Home 3   Number of Stairs Within Home 0   Transportation Available car;family or friend will provide   Self-Care   Usual Activity Tolerance moderate   Current Activity Tolerance moderate   Regular Exercise no   Equipment Currently Used at Home none   Functional Level Prior   Ambulation 0-->independent   Transferring 0-->independent   Toileting 0-->independent   Bathing 0-->independent   Dressing 0-->independent   Eating 0-->independent   Communication 0-->understands/communicates without difficulty   Swallowing 0-->swallows foods/liquids without difficulty   Cognition 0 - no cognition issues reported   Fall history within last six months no   General Information   Referring Physician Charisse Rios-Pass   Patient/Family Goals Statement return to PLOF   Additional Occupational Profile Info/Pertinent History of Current Problem Rose Santos is a 51yo female with PMH of COPD and squamous cell lung carcinoma s/p RUL and RML lobectomy 2013 and chemotherapy; with multiple recurrences requiring chemotherapy and radiation therapy. Transferred to [facility] 7/25 with acute hypercapnic respiratory failure requiring intubation 7/25 likely due to right and left mainstem bronchi infiltrations vs. mass effect   Precautions/Limitations no known precautions/limitations   General Observations pt motivated in therapy    Cognitive Status Examination   Orientation orientation to person, place and time   Level of Consciousness alert   Able to Follow Commands WNL/WFL   Personal Safety (Cognitive) WNL/WFL   Memory intact   Attention No deficits were identified   Cognitive Comment no cognitive concerns   Visual Perception   Visual Perception No deficits were identified   Visual Perception Comments no deficits.    Sensory  Examination   Sensory Quick Adds No deficits were identified   Range of Motion (ROM)   ROM Quick Adds No deficits were identified   Strength   Manual Muscle Testing Quick Adds Other   Strength Comments B UE's grossly 4/5   Hand Strength   Hand Strength Comments no deficits.    Coordination   Coordination Comments no deficits   Transfer Skill: Bed to Chair/Chair to Bed   Level of [place]: Bed to Chair stand-by assist   Transfer Skill: Sit to Stand   Level of [place]: Sit/Stand stand-by assist   Upper Body Dressing   Level of [place]: Dress Upper Body minimum assist (75% patients effort)   Lower Body Dressing   Level of [place]: Dress Lower Body moderate assist (50% patients effort)   Instrumental Activities of Daily Living (IADL)   Previous Responsibilities meal prep;housekeeping;laundry;medication management;finances;driving   IADL Comments SO assists as needed at home when pt limited by deconditioning and fatigue.    Activities of Daily Living Analysis   Impairments Contributing to Impaired Activities of Daily Living fear and anxiety  (fatigue and decreased activity tolerance. )   General Therapy Interventions   Planned Therapy Interventions ADL retraining;IADL retraining;bed mobility training;transfer training;home program guidelines;progressive activity/exercise;risk factor education   Clinical Impression   Criteria for Skilled Therapeutic Interventions Met yes, treatment indicated   OT Diagnosis decreased ADL I   Assessment of Occupational Performance 3-5 Performance Deficits   Identified Performance Deficits community mobility, showering, dressing.    Clinical Decision Making (Complexity) Low complexity   Therapy Frequency 5 times/wk   Predicted Duration of Therapy Intervention (days/wks) 1 week   Anticipated Discharge Disposition Home with Assist   Risks and Benefits of Treatment have been explained. Yes   Patient, Family & other staff in agreement with plan of care Yes   Clinical  "Impression Comments Pt presents to OT with general deconditioning and fatigue leading to decreased ADL I. pt to benefit from skilled OT intervention to address the above problem list. See daily note for treatment provided today.    [facility] TM \"6 Clicks\"   2016, Trustees of [facility], under license to [employer].  All rights reserved.   6 Clicks Short Forms Daily Activity Inpatient Short Form   [facility]  \"6 Clicks\" Daily Activity Inpatient Short Form   1. Putting on and taking off regular lower body clothing? 3 - A Little   2. Bathing (including washing, rinsing, drying)? 3 - A Little   3. Toileting, which includes using toilet, bedpan or urinal? 4 - None   4. Putting on and taking off regular upper body clothing? 4 - None   5. Taking care of personal grooming such as brushing teeth? 4 - None   6. Eating meals? 4 - None   Daily Activity Raw Score (Score out of 24.Lower scores equate to lower levels of function) 22   Total Evaluation Time   Total Evaluation Time (Minutes) 5     "

## 2018-07-27 NOTE — PLAN OF CARE
Problem: Patient Care Overview  Goal: Plan of Care/Patient Progress Review  PT 4A: Will sign off. Per conversation with OT and chart review, no acute PT needs identified. Orders completed.

## 2018-07-27 NOTE — PLAN OF CARE
Problem: Patient Care Overview  Goal: Plan of Care/Patient Progress Review  Outcome: Improving  D/I: Patient on unit 4A Surgical/Neuro ICU   Neuro- A/Ox4. Flat affect. PERRL.  CV- HR shiraz to high 50s at times while asleep. BP stable. Tele: SR  Pulm- LS coarse throughout. Frequent IS encouraged. Fair/good cough with splinting. Producing moderate amounts of pink/red sputum  GI- BS active. Passing flatus. No BM overnight.  - Aguillon in place with large output (200-350cc/hr) this am. Urine is clear light yellow  Gtts- NS at 100  Skin- Warm and dry  Pain- PRN Oxycodone and IV dilaudid effective but developed nausea. PRN IV zofran minimally effective - PRN IV compazine worked well.  IV's - R port and L PIV.  See flow sheets for further interventions and assessments.   A: Stable   P: Continue to monitor pt closely. Notify MD of significant changes

## 2018-07-27 NOTE — PROGRESS NOTES
"MICU Progress note     Interval events: Returned from OR extubated; started on hypertonic nebs BID with aerobika flutter valve. Some pain (chronic back and throat) yesterday; resumed equivalent dosing to home med with some additional dilaudid / oxy. Some nausea overnight, but otherwise stable and requesting discharge.    Exam  /72  Pulse 100  Temp 97.9  F (36.6  C) (Oral)  Resp 21  Ht 1.575 m (5' 2\")  Wt 72.5 kg (159 lb 13.3 oz)  SpO2 98%  BMI 29.23 kg/m2     Gen: Alert and calm in bed, NAD  HEENT: ETT. No jaundice or icteric pupils   Pulm: Coarse with some crackles bilaterally. Minimal wheeze with prolonged upper field expiration.  CV: RRR. Normal s1, s2   Abd: Soft NTND   Extremities: warm, no peripheral edema     Assessment and Plan  Rose Santos is a 51yo female with PMH of COPD and squamous cell lung carcinoma s/p RUL and RML lobectomy 2013 and chemotherapy; with multiple recurrences requiring chemotherapy and radiation therapy. Transferred to [facility] 7/25 with acute hypercapnic respiratory failure requiring intubation 7/25 likely due to right and left mainstem bronchi infiltrations vs. mass effect. Tissue debulking 7/26 and returned extubated without respiratory difficulty.     Changes on 7/26  - transfer to [facility] medicine if amenable  - caffeine pill for headache  - f/u chemo / radiation plan with malignant heme; defer rad onc consult at present   - f/u steroid plan with interventional pulm  - d/c'd vanc / zosyn, mIVf and weinberg  - PT/OT    Neurologic  Sedation / analgesia:   - tylenol 325 mg q4h  - oxycodone er 30 BID  - 0.3-0.5 mg dilaudid q1h prn  - 5-10 mg oxycodone q4h prn    Headache: chronic with some minimal complaints post-op.  - Caffeine pill     Cardiovascular  Hemodynamically stable, EKG 7/25 sinus tachy with RA enlargement. Echo 8/2017 EF 65%; h/o positive bubble study 3/24/15.  - Tele monitoring  MAP goal: >65 mmHg     Pulmonary  #Acute hypercapnic respiratory failure: Intubated 7/25. " Possibly multifactorial with right and left mainstem bronchi infiltrations, COPD, pulmonary infection. Tumor infiltrations are likely the major cause of respiratory failure. However, has history of COPD and wheezing on physical exam, which is likely contributing. Elevated WBCs on admission. Post-obstructive pna is less likely since she has been afebrile and CXR 7/26 stable. Debulking and stenting tolerated well; extubated and stable  - hypertonic saline nebs BID with aerobika  - f/u with interventional pulm on possible steroids and discharge planning  - Oncology consulted; appreciate recs  - s/p Methylprednisolone ONCE   - Duoneb q6h     Gastrointestinal  Nausea: post-procedural and not persistent  - Zofran PRN     Infectious Disease  Leukocytosis - improved  - Daily CBC     C/f post-obstructive pna: no infectious symptoms.  - discontinue vanc / zosyn     Cultures: BAL NGTD     Hematology  Bright red blood with suctioning after intubation. Hgb 10.9 (from 12.3). Likely trauma-induced, but will continue to monitor Hgb.  - CBC qday    Lung-primary SCC s/p RLL/RML lobectomy with mediastinal recurrence: s/p multiple rounds chemo and radiation. Please see Dr. Swanson's note 7/25 for details.  - Oncology consulted and following; appreciate recs  - defer rad onc consult at present.     Endocrine  No acute issues.     Renal/Electolytes/FEN  #Respiratory acidosis: - improved. Tumor obstruction likely causing CO2 retention.      Creatinine: 0.88 (baseline: 0.8-1)     Skin/MSK/Rheum:  No acute issues     PPX:   - VTE: SCDs, hold enoxaparin in preparation for OR tomorrow.  - GI: Protonix  FEN: dc mIVF  Tubes/Lines/Drains: LUE PIV, Port-a-cath, ETT   - pull weinberg     CODE: Full  Dispo: gen medicine; Gold Team    Fady Gonzalez MD  Internal Medicine - Dermatology PGY 1  [phone]       Attending note:  Patient seen, examined and discussed with the Resident physician. All data reviewed. Agree with assessment and plan as outlined in  the above note.    oLis Abdi MD  [phone]

## 2018-07-27 NOTE — CONSULTS
"ONCOLOGY INITIAL CONSULT NOTE  07/27/18  11:58 AM    Assessment:  Recurrent squamous cell carcinoma of the lung  S/p tumor debulking and stent placement in L main bronchus    Ms. Santos is a 52-year-old woman with recurrent lung cancer, who has had increasing dyspnea over the past 6-7 months.  This culminated in a need for debulking and stenting.  Ms. Santos follows with an oncologist at [facility], Dr. Suzy Limon.  Dr. Limon's notes indicate that she was considering gemcitabine as an option for further chemotherapy treatment.  It sounds like Ms. Santos is unsure of how she wants to proceed at this point, however, we recommend follow up with Dr. Limon for further consideration of chemotherapy.    Thank you for involving us in the care of Ms. Santos.  Patient seen and discussed with faculty, Dr. Nieves.    Hali Alexandra MD  Hematology-Oncology-Transplant Fellow    ---------------------------------------------------------------  History of present illness:  Ms. Santos is a 52-year-old woman with recurrent squamous cell carcinoma of the lung, who presented to [facility] with worsening dyspnea and wheezing.  Bronchoscopy performed on 7/24 showed mass on R mainstem and tumor infiltrating the L mainstem bronchus.  Biopsy showed squamous cell lung carcinoma.  Respiratory status continued to decline and she was transferred to the [facility] for tumor debulking and stent placement.    Here, the L main bronchus was 90% occluded and was debulked, with a stent placed after the procedure.    On interview, Ms. Santos is feeling \"much better\" than when she went to [facility], however, she feels more short of breath than last spring.  She is uncertain as to whether she would like to receive more chemotherapy.    A complete review of systems was performed and was negative with the exception of pertinent positives noted above.    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  Treated with radiation.  Symptoms improved by January 2018, but then had worsening SOB in April 2018    Past medical history:  GERD  Asthma  Hypothyroid  Dyslipidemia    Past surgical history:  Bilateral oophorectomy with endometrial ablation    Family history:  Father with COPD  Sister with breast cancer  Maternal grandmother with GI cancer    Social history:  Lives in a home with her fiance Keyur and her son.  Smoker for about 30 years, but quit in 2008.  No alcohol or illicit drugs.    Medications:  Marinol  Duonebs  Levothyroxine  Omeprazole  Oxycontin  K-roberto  Compazine  Topamax  Trazodone  Incruse Ellipta    Allergies:  No known allergies.    OBJECTIVE DATA  Blood pressure 132/81, pulse 100, temperature 97.9  F (36.6  C), temperature source Oral, resp. rate 16, height 1.575 m (5' 2\"), weight 72.5 kg (159 lb 13.3 oz), SpO2 99 %.  -- General: no acute distress  -- HEENT: mucous membranes moist, no erythema; pupils equally round, reactive  -- Cardiovascular: regular rate and rhythm, no murmurs; no peripheral edema or JVD  -- Pulmonary: lungs clear bilaterally, equal diaphragmatic excursion, no wheezes or crackles  -- Gastrointestinal: abdomen soft, non-tender, non-distended; bowel sounds present; no organomegaly  -- Musculoskeletal: no swelling or erythema of joints  -- Integumentary: no rashes  -- Neurologic: alert and oriented to self, place, time, but unclear whether she comprehends the conversation well  -- Psychiatric: blunted affect, cooperative    Relevant laboratory " workup:  WBC 9.0  Hgb 9.8  Platelets 198    I personally reviewed the following relevant imaging:  Chest CT 7/25/18 with posterior R midlung mass concerning for local tumor recurrence, as well as R hilar soft tissue nodule, 14mm, with invasion into the L main bronchus.

## 2018-07-27 NOTE — PLAN OF CARE
Pt arrived on 5A, VSS on 1L NC. Up with SBA. Belongings placed in pts closet. LS wheezy and crackles present. Will monitor pts pain and resp status and continue POC.

## 2018-07-27 NOTE — PLAN OF CARE
Problem: Patient Care Overview  Goal: Plan of Care/Patient Progress Review  Discharge Planner OT   Patient plan for discharge: home with assist  Current status: pt ambulating in hallway SBA greater than 350 feet requiring 2L NC to maintain O2 sats greater than 90% during ambulation, at rest pt O2 sats 94% on RA.    Barriers to return to prior living situation: none  Recommendations for discharge: home with assist as needed, outpatient [place] for conditioning  Rationale for recommendations: Pt with sufficient I to return home safely however would benefit from continued skilled conditioning to maximize I/safety.        Entered by: Erick Matute 07/27/2018 1:33 PM

## 2018-07-27 NOTE — PLAN OF CARE
Problem: Patient Care Overview  Goal: Plan of Care/Patient Progress Review  Outcome: No Change  Pt A/Ox4, VSS on 1L NC (baseline of 2-3L at home) and C/O pain in back. PRN oxycodone given, relief. On reg diet. LS wheezy and crackles present. Pt voided on unit x2, weinberg removed this afternoon. R chest port SL. Plan is to wait for lung biopsy results. Will continue to monitor pt and follow POC.

## 2018-07-28 NOTE — PROGRESS NOTES
Time [id number]      Reason for admission: Respiratory failure requiring intubation  Vitals: Heart rate in 40's, RRT called   Activity: Up SBA  Pain: chronic back pain  Neuro: A&Ox4; bilateral strength equal  Cardiac: bradycardic  Respiratory: lungs sound crackles and wheezes throughout  GI/: WNL  Diet: reg; no appetite- nauseous tonight  Lines: PIV inserted during RRT. Port accessed  Wounds: skin CDI  Labs/imaging: EKG; BMP drawn during rapid      New changes this shift: RRT called d/t low heart rate. Vitally stable otherwise. Narcan given. 1 L bolus given. EKG sinus shiraz.         Plan: Transfer to  for closer monitoring.   Report given to RN      Continue to monitor and follow POC

## 2018-07-28 NOTE — PROGRESS NOTES
[facility], [place]   Hospitalist Daily Progress Note                                                 Date of Admission:2018  ___________________________________  INTERVAL HISTORY (24 Hrs)/SUBJECTIVE:   Last 24 hr events, care team notes reviewed.     Patient feeling better this am including her breathing.   No cp or sob  No fever or chills  Pain controlled.   No other concern.     Fiance at bedside.     Addendum:  Later pt woke up w nausea. Cough. LH. Noted SB in 40s.   EKG: done. Sinus shiraz.   See under CVS below.     ROS: 4 point ROS neg other than the symptoms noted above in the interval history.    OBJECTIVE :   VITALS:    Temp:  [95.9  F (35.5  C)-98.5  F (36.9  C)] 95.9  F (35.5  C)  Heart Rate:  [43-68] 47  Resp:  [16-28] 20  BP: (111-149)/(53-74) 139/65  SpO2:  [96 %-100 %] 100 %     Temp (24hrs), Av.6  F (36.4  C), Min:96.4  F (35.8  C), Max:98.5  F (36.9  C)    Wt Readings from Last 5 Encounters:   18 72.5 kg (159 lb 13.3 oz)      No intake or output data in the 24 hours ending 18 1519    PHYSICAL EXAM:  General: alert, interactive, NAD  HEENT: AT/NC, PERRLA, Moist MM  Neck: Supple, no JVD or Lymphadenopathy . No stridor  Respi/Chest:  bl coarse crackles. Diminished R base.   CVS/Heart: S1S2 regular, no m/r/g,   Gi/Abd: Soft, non tender, non distended, BS +  MSK/Ext: Distal pulses 2+.     Neuro: AO x 4, no new focal deficit.      Medications:   I have reviewed this patient's current medications.      Data:   All laboratory and imaging data in the past 24 hours reviewed:    LABS:  CMP    Recent Labs  Lab 18  1458 18  1456 18  0412 18  0324 18  1926     --  144 145* 145*   POTASSIUM 3.5 3.6 2.9* 3.4 3.6   CHLORIDE 104  --  110* 113* 114*   CO2 30  --  28 24 20   ANIONGAP 6  --  7 8 12   *  --  104* 149* 108*   BUN 4*  --  5* 10 10   CR 0.50*  --  0.64 0.69 0.88   GFRESTIMATED >90  --  >90 88 67   GFRESTBLACK >90   --  >90 >90 81   RADHA 8.4*  --  7.5* 7.6* 8.6   MAG 2.0  --  2.1 1.9  --    PHOS  --  2.8 2.4* 2.4*  --    PROTTOTAL  --   --   --   --  7.0   ALBUMIN  --   --   --   --  3.1*   BILITOTAL  --   --   --   --  0.2   ALKPHOS  --   --   --   --  64   AST  --   --   --   --  29   ALT  --   --   --   --  33     CBC  Recent Labs  Lab 07/28/18  0636 07/27/18  0412 07/26/18  0324 07/26/18  0034  07/25/18  1926   WBC 7.4 9.0 12.6*  --   --  22.1*   RBC 4.26 3.67* 3.81  --   --  4.50   HGB 11.3* 9.8* 10.2* 10.3*  < > 12.3   HCT 37.4 32.3* 33.1*  --   --  39.6   MCV 88 88 87  --   --  88   MCH 26.5 26.7 26.8  --   --  27.3   MCHC 30.2* 30.3* 30.8*  --   --  31.1*   RDW 14.6 15.2* 15.1*  --   --  15.2*    198 211  --   --  349   < > = values in this interval not displayed.  INR    Recent Labs  Lab 07/25/18 2029   INR 1.04     Unresulted Labs Ordered in the Past 30 Days of this Admission     Date and Time Order Name Status Description    7/26/2018 0844 Surgical pathology exam In process           Recent Results (from the past 24 hour(s))   XR Chest Port 1 View    Narrative    XR CHEST PORT 1 VW  7/28/2018 10:30 AM      HISTORY: follow up pleural effusion, infiltrates.;     COMPARISON: 7/27/2018      Impression    IMPRESSION:   1. Decrease in size of right pleural effusion.  2. Improving right-sided pulmonary opacities.  Decreased left lung  interstitial opacities.    NAHED LEE MD     Lab Results   Component Value Date    TROPI <0.015 07/28/2018   Lactic acid: 0.7      ASSESSMENT & PLAN :    Rose Santos is a 53yo female with PMH of COPD and squamous cell lung carcinoma s/p RUL and RML lobectomy 2013 and chemotherapy; with multiple recurrences requiring chemotherapy and radiation therapy. Transferred to [facility] 7/25 with acute hypercapnic respiratory failure requiring intubation 7/25 likely due to right and left mainstem bronchi infiltrations vs. mass effect. Tissue debulking 7/26 and returned to ICU extubated without  respiratory difficulty.  Tf to medicine floor 07/27/18.     PMH  Oncologic history: Squamous cell carcinoma of R lung  -  s/p RLL and treatment with cisplatin and etoposide.  - XRT for mediastinal recurrentce in 2014.   - Recurrence in R mainstem bronchus in 10/2017. A bronchoscopy on 7/12/2018 showed necrotizing granulomas and fungal hyphae c/w aspergillus.   - She was initially treated with voriconazole, but this was held due to dyspnea that occurred with administration.     # Acute HC respiratory failure 2/2 advanced NSCLC with recurrence, now with bilateral mainstem disease   Intubated 7/25. Possibly multifactorial with right and left mainstem bronchi infiltrations, COPD, pulmonary infection. Tumor infiltrations are likely the major cause of respiratory failure. Better after following procedure. Now extubated. On NC.     Procedure(s):07.26.18  Flexible and Rigid Bronchoscopy with Stent Placement, Tumor Debulking using Cryoablation and Argon Plasma Coagulation, Bronchial Washing - Wound Class: II-Clean Contaminated   - Wound Class: II-Clean Contaminated     MRSA swab neg.     Doing better.   Fu cxr as above better    - Ct to monitor closely respiratory status, ct pulse ox: doing better. On 1L NC sating high 90s. Was using oxygen at home as needed per patient.   Titrate oxygen to keep sat >92   - Ct current nebs- duonebs, saline nebs. aerobika.   - aggressive pulm toileting  - reviewed case including R pleural effusion with Pulmonary. Aw Pulm input.   - Oncology consult appreciated. recs reviewed. Outpatient fu.   - leucocytosis- resolved. Off abx. Monitor off abx.   - fu cultures; BAL: NGTD      # CVS: Echo 8/2017 EF 65%    7/28/2018  Patient with Sinus bradycardia, reported LH. Preceded by nausea, cough. BP maintained. Oxygen unchanged high 90s on 1L  No cp or sob.   EKG stat done: c.w SB.   Suspect related to dehydration, Narcotics. Noted HR gets better when patient Is more awake. Drops when pt fall  asleep  Stat 1 L NSS bolus  Rapid response called for nursing.   Narcan 0.4 iv x1 given- woke up feeling bit funny. HR went up to 70s.   Stat labs including BMP, Mg, Trop. Lactic acid (reviewed) see above.     At this time, tf patient to 6B for cardiac tele and close monitoring.   Holding narcotics.   Will get Echo.     [facility] RN. Charge RN. Signed out to Cross cover.   Patient and fiance updated.       Headache: chronic with some minimal complaints post-op.  - Caffeine pill    GI: nausea; Antiemetics prn.    - PPI        # Pain Assessment:  Current Pain Score 7/28/2018   Patient currently in pain? yes   Pain score (0-10) -   Pain location Back   CPOT pain score -     Schedule tylenol 975 tid  Holding narcotics given bradycardia and somnolence.       FEN: regular diet. Ivf.   DVT/GI prophylaxis: Enoxaparin (Lovenox) SQ, PPI  Code Status: FULL CODE    Lines & tubes: Port a cath.     Dispo: Disposition Plan   Expected discharge in 2-3 days to prior living arrangement once medically ready.     Entered: Caden Corrales 07/28/2018, 4:13 PM         Plan discussed with patient, bedside RN and RAVINDRA PAYNE during Care Team Rounds.  [facility] team    Caden Corrales MD   Hospitalist (Internal Medicine)  Pager: [phone].

## 2018-07-28 NOTE — PLAN OF CARE
"Problem: Patient Care Overview  Goal: Plan of Care/Patient Progress Review  Outcome: No Change  /70 (BP Location: Left arm)  Pulse 100  Temp 98.4  F (36.9  C) (Oral)  Resp 26  Ht 1.575 m (5' 2\")  Wt 72.5 kg (159 lb 13.3 oz)  SpO2 98%  BMI 29.23 kg/m2  1265-2555  No significant change over shift.        "

## 2018-07-28 NOTE — PLAN OF CARE
Problem: Patient Care Overview  Goal: Plan of Care/Patient Progress Review  Outcome: Improving  D/I/A: Transferred to  around 1600 via w/c accompanied by RN.  Report given to MELODY Pavon.  Pt on 4A Neuro/Surgical ICU POD #1 balloon bronchoplasty with stent placement, biopsies, and tissue/tumor debulking.  PMH includes: SCC and non-small cell carcinoma, s/p radiation, RML and RLL lobectomies, depression, chronic pain, COPD, and HLD.    Neuro: Intact, generalized weakness  CV: NSR, occ PVCs  Resp: LS coarse throughout with ex wheeze, 1LPM via NC.  Aggressive pulmonary toilet encouraged.    GI: Bowel sounds active x4, regular diet with fair PO.    : Aguillon removed around 1400, voiding spontaneously via commode.     Skin: Port to R chest SL, PIV x1 to LFA.    Lytes: K+ and phos replaced, labs redrawn around 1500.

## 2018-07-28 NOTE — PLAN OF CARE
Problem: Patient Care Overview  Goal: Plan of Care/Patient Progress Review  A&Ox4. VSS on 1LO2. Flat affect and slow to respond. Reports of pain in back- scheduled tylenol and oxycontin given. Iv zofran given x1 for nausea. R chest port-SL. Up w/ SBA. Voiding. 1 BM overnight. Pt suction independently.  Will cont to monitor.

## 2018-07-28 NOTE — PROGRESS NOTES
Transfer  Transferred from: 5A  Via: Bed  Reason for transfer: Pt appropriate for 6B- bradycardia  Family: Spouse at bedside  Belongings: Received with pt  Chart: Received with pt  Medications: Meds received from old unit with pt  2 RN Skin Assessment Completed By: Rai AU & Starla GODOY  Report received from: Flaco DUMONT RN  Pt status: VSS at time of transfer; 2L oxygen via nasal cannula. LR infusing. Drowsy, opens eyes spontaneously, responds to voice.

## 2018-07-28 NOTE — PLAN OF CARE
Problem: Patient Care Overview  Goal: Plan of Care/Patient Progress Review  Discharge Planner OT   Patient plan for discharge: home   Current status: Patient completes LB clothing management SBA without A D. Pt completes hallway ambulation with no LOB greater than 400ft with 1 L O2 and VS stable. HR 70s, O2 92% on 1L with walking, 94% at rest. Pt reports no concerns with balance but continues to demo deficits with endurance and activity tolerance.   Barriers to return to prior living situation: none  Recommendations for discharge: home with assist  Rationale for recommendations: Pt with sufficient I to return home safely however would benefit from continued skilled conditioning to maximize I/safety       Entered by: Diamond Crane 07/28/2018 1:18 PM

## 2018-07-29 NOTE — PROVIDER NOTIFICATION
"Repeated statements by patient of feeling \"off\" and \"not right\". BPs stable. Afebrile. HR in 40s up to 60s. Patient is oddly lethargic. Easily aroused but occasionally will fall asleep during cares. Neuro checks otherwise unremarkable.  C/o nausea and emesis. PRN compazine and zofran given. C/o intermittent dizziness, particularly with standing. Notified Gold cross-cover of patient repeated complaints, was instructed to let patient sleep. Will continue to monitor.   "

## 2018-07-29 NOTE — PHARMACY-ADMISSION MEDICATION HISTORY
"Admission medication history interview status for the 7/25/2018 admission is complete. See Epic admission navigator for allergy information, pharmacy, prior to admission medications and immunization status.     Medication history interview sources:  [facility]    Changes made to PTA medication list (reason)  Added: All meds were added as the patient had no medications in our system  Deleted: none  Changed: none    Additional medication history information (including reliability of information, actions taken by pharmacist):  1. Patient was poor historian - unable to recall doses/frequency and only slightly familiar with medication names  2. Called for med history from [facility] and combined that information with recent discharge summary from [facility]  3. Patient recently started on voriconazole (filled on 7/11/18) but this was dc'd upon hospital admit due to concern for patient intolerance (See [facility] Care everywhere)  4. Patient had been on dronabinol in 2017 but she endorsed that she stopped taking it because it \"wasn't working\" - last fill at pharmacy was 10/2017.  5. Patient was on fairly high dose of topiramate per the dose confirmed by the pharmacist at [facility] (150mg AM, 200mg PM). Since this has been held for > 1 week, I recommend resume at 50mg AM, 100mg HS for ~3 days and then titrate up as patient tolerates it by 50-100mg per titration.         Prior to Admission medications    Medication Sig Last Dose Taking? Auth Provider   albuterol (PROAIR HFA/PROVENTIL HFA/VENTOLIN HFA) 108 (90 Base) MCG/ACT Inhaler Inhale 2 puffs into the lungs every 4 hours as needed for shortness of breath / dyspnea or wheezing  Yes Unknown, Entered By History   ESCITALOPRAM OXALATE PO Take 10 mg by mouth daily  Yes Unknown, Entered By History   fluticasone-salmeterol (ADVAIR) 500-50 MCG/DOSE diskus inhaler Inhale 1 puff into the lungs 2 times daily  Yes Unknown, Entered By History "   ipratropium - albuterol 0.5 mg/2.5 mg/3 mL (DUONEB) 0.5-2.5 (3) MG/3ML neb solution Take 1 vial by nebulization every 6 hours as needed for shortness of breath / dyspnea or wheezing  Yes Unknown, Entered By History   LEVOTHYROXINE SODIUM PO Take 150 mcg by mouth  Yes Unknown, Entered By History   naloxone (NARCAN) nasal spray Spray 4 mg into one nostril alternating nostrils as needed for opioid reversal every 2-3 minutes until assistance arrives  Yes Unknown, Entered By History   OMEPRAZOLE PO Take 20 mg by mouth every morning  Yes Unknown, Entered By History   OxyCODONE HCl (OXYCONTIN PO) Take 30 mg by mouth every 12 hours  Yes Unknown, Entered By History   OXYCODONE HCL PO Take 5 mg by mouth every 4 hours as needed (Pain) Max of 6 tabs per day  Yes Unknown, Entered By History   polyethylene glycol (MIRALAX/GLYCOLAX) Packet Take 1 packet by mouth 2 times daily as needed for constipation  Yes Unknown, Entered By History   potassium chloride ana er (K-DUR) Take 20 mEq by mouth 2 times daily  Yes Unknown, Entered By History   Prochlorperazine Maleate (COMPAZINE PO) Take 10 mg by mouth every 6 hours as needed for nausea  Yes Unknown, Entered By History   TOPIRAMATE PO Take 150mg daily in AM, and 200mg daily at bedtime  Yes Unknown, Entered By History   TOPIRAMATE PO Take 50 mg by mouth daily as needed Take 50mg in afternoon as needed for pain  Yes Unknown, Entered By History   TRAZODONE HCL PO Take 50 mg by mouth At Bedtime  Yes Unknown, Entered By History         Medication history completed by: Danielle Donovan, RandallD

## 2018-07-29 NOTE — PLAN OF CARE
"Problem: Patient Care Overview  Goal: Plan of Care/Patient Progress Review  Outcome: No Change  Neuro: Oriented x 4. Lethargic, falling asleep occasionally during cares. Repeated reports of feeling \"off\". See provider notification note.   Cardiac: HR dips down into 40s-50s then back up into 60s-70s. BPs stable. Afebrile.   Respiratory: SpO2 > 95% on 2L NC. Very coarse and crackly lungs. Encouraging frequent IS use and coughing/deep breathing. Denies SOB. RR in mid/high 20s up to low 30s.   GI/: No BM this shift. Adequate output up to bedside commode.   Diet/Appetite: Decreased appetite due to nausea/emesis. Regular diet.   Activity: SBA up to bedside commode.   Pain: Denies.   Skin: No new changes noted.   LDAs: R chest port with LR at 125 ml/hour. L PIV, SL.      Continue with POC. Notify primary team with changes.   Berkley Lazo RN              "

## 2018-07-29 NOTE — PLAN OF CARE
Problem: Patient Care Overview  Goal: Plan of Care/Patient Progress Review  Pt with stable vitals, HR mid50's to 70's; ECHO and CXR done at bedside; pt home meds and oxy restarted, oxy prn given with some effect for pain; pt up to commode to void, lasix given this afternoon; pt tolerating meals with one dose anti emetic given with effect for nausea; K+ and Mg replaced; Continue to monitor respiratory status, vitals pain control as per plan of care, see also flow sheets for details

## 2018-07-29 NOTE — PROGRESS NOTES
Social Work: Assessment with Discharge Plan    Patient Name:  Rose Jacobs  :  1965  Age:  52 year old  MRN:  [id number]  Risk/Complexity Score:     Completed assessment with:  Patient    Presenting Information   Reason for Referral:  Discharge plan  Date of Intake:  2018  Referral Source:  Physician  Decision Maker:  Pt is own decision maker  Alternate Decision Maker:  spouse  Health Care Directive:  Provided education  Living Situation:  House  Previous Functional Status:  Independent  Patient and family understanding of hospitalization:  yes  Cultural/Language/Spiritual Considerations:  None noted  Adjustment to Illness:  coping    Physical Health  Reason for Admission:  No diagnosis found.  Services Needed/Recommended:  Home with no services    Mental Health/Chemical Dependency  Diagnosis:  NA  Support/Services in Place:  NA  Services Needed/Recommended:  NA    Support System  Significant relationship at present time:  spouse  Family of origin is available for support:  yes  Other support available:  Family and friends  Gaps in support system:  None reported  Patient is caregiver to:  None     Provider Information   Primary Care Physician:  System, Provider Not In   None   Clinic:         :  [facility]    Financial   Income Source:  Not discussed  Financial Concerns:  Not discussed  Insurance:    Payor/Plan Subscriber Name Rel Member # Group #   MEDICARE - MEDICARE LOGELIN,CRYSTAL K  [id number]       ATTN CLAIMS, [address]   [employer] [facility] ROSE JACOBS  [id number]      [address]       Discharge Plan   Patient and family discharge goal:  To go home as soon as possible  Provided education on discharge plan:  YES  Patient agreeable to discharge plan:  YES  A list of Medicare Certified Facilities was provided to the patient and/or family to encourage patient choice. Patient's choices for facility are:  NA - plan is for ID home  Will NH provide Skilled rehabilitation or  "complex medical:  NA  General information regarding anticipated insurance coverage and possible out of pocket cost was discussed. Patient and patient's family are aware patient may incur the cost of transportation to the facility, pending insurance payment: NA  Barriers to discharge:  None     Discharge Recommendations   Anticipated Disposition:  Home, no needs identified  Transportation Needs:  Family:  spouse  Name of Transportation Company and Phone:  NA    Additional comments   SW met with Pt for initial assessment. Pt was awake and alert and oriented. She denied pain or concern, but said \"I want to get home as soon as possible\". She says she misses home and cant wait to get there and relax. She lives with her spouse, son and sister. She feels she has good support. She is set up with home oxygen and reports that there are no barriers in the home. Pt has no other concerns at this time.       "

## 2018-07-29 NOTE — PROGRESS NOTES
[facility], [place]   Hospitalist Daily Progress Note                                                 Date of Admission:2018  ___________________________________  INTERVAL HISTORY (24 Hrs)/SUBJECTIVE:   Last 24 hr events, care team notes reviewed.     Patient tf to 6B 07.28  for somnolence, LH, sinus bradycardia (as low as 40s), feeling funny.   Improved after holding narcotics, narcan. Ivf.     Today. Overall feels better  Still feels funny and somewhat light headed but much better.   HR better this am. On Tele.   Reports that she is not getting her antidepressant     No cp or sob- stable. Intermittent cough- ongoing.   No NV or pain abdomen.   No fever or chills  Pain controlled at current.   No other concern.     Poor historian.    ROS: 4 point ROS neg other than the symptoms noted above in the interval history.    OBJECTIVE :   VITALS:    Temp:  [95.9  F (35.5  C)-98.5  F (36.9  C)] 98.5  F (36.9  C)  Heart Rate:  [43-68] 55  Resp:  [20-32] 24  BP: (115-149)/(53-80) 134/72  FiO2 (%):  [2 %] 2 %  SpO2:  [99 %-100 %] 100 %     Temp (24hrs), Av.8  F (36.6  C), Min:95.9  F (35.5  C), Max:98.5  F (36.9  C)      Wt Readings from Last 5 Encounters:   18 69.6 kg (153 lb 6.4 oz)      No intake or output data in the 24 hours ending 18 1519    PHYSICAL EXAM:  General: alert, interactive, NAD  HEENT: AT/NC, PERRLA, Moist MM  Neck: Supple, no JVD or Lymphadenopathy . No stridor  Respi/Chest:  bl coarse crackles. occ wheeze exp. Diminished R base.   CVS/Heart: S1S2 regular, no m/r/g,   Gi/Abd: Soft, non tender, non distended  MSK/Ext: Distal pulses 2+.     Neuro: AO x 4, no new focal deficit.      Medications:   I have reviewed this patient's current medications.      Data:   All laboratory and imaging data in the past 24 hours reviewed:    LABS:  CMP    Recent Labs  Lab 18  0545 18  1458 18  1456 18  0412 18  0324 18  1926    140   --  144 145* 145*   POTASSIUM 3.2* 3.5 3.6 2.9* 3.4 3.6   CHLORIDE 103 104  --  110* 113* 114*   CO2 30 30  --  28 24 20   ANIONGAP 9 6  --  7 8 12   * 113*  --  104* 149* 108*   BUN 5* 4*  --  5* 10 10   CR 0.53 0.50*  --  0.64 0.69 0.88   GFRESTIMATED >90 >90  --  >90 88 67   GFRESTBLACK >90 >90  --  >90 >90 81   RADHA 8.5 8.4*  --  7.5* 7.6* 8.6   MAG 2.2 2.0  --  2.1 1.9  --    PHOS 2.7  --  2.8 2.4* 2.4*  --    PROTTOTAL  --   --   --   --   --  7.0   ALBUMIN  --   --   --   --   --  3.1*   BILITOTAL  --   --   --   --   --  0.2   ALKPHOS  --   --   --   --   --  64   AST  --   --   --   --   --  29   ALT  --   --   --   --   --  33     CBC    Recent Labs  Lab 07/29/18  0545 07/28/18  0636 07/27/18  0412 07/26/18  0324   WBC 7.4 7.4 9.0 12.6*   RBC 4.22 4.26 3.67* 3.81   HGB 11.3* 11.3* 9.8* 10.2*   HCT 36.7 37.4 32.3* 33.1*   MCV 87 88 88 87   MCH 26.8 26.5 26.7 26.8   MCHC 30.8* 30.2* 30.3* 30.8*   RDW 14.3 14.6 15.2* 15.1*    237 198 211     INR    Recent Labs  Lab 07/25/18 2029   INR 1.04     Unresulted Labs Ordered in the Past 30 Days of this Admission     Date and Time Order Name Status Description    7/26/2018 0844 Surgical pathology exam In process         Recent Results (from the past 24 hour(s))   XR Chest Port 1 View    Narrative     Examination:  XR CHEST PORT 1 VW     Date:  7/29/2018 12:22 PM      Clinical Information: dyspnea, wheezing;     Comparison: 7/28/2018, 10:20 5:00 AM.      Impression    1. Possible increasing mild pulmonary edema.  2. Chronic changes right hemithorax which are stable..         Lab Results   Component Value Date    TROPI <0.015 07/28/2018 07.28: Lactic acid: 0.7  7/29/2018: procal: <0.05.     Venous Blood Gas    Recent Labs  Lab 07/29/18  0846 07/26/18  0536 07/25/18  2110 07/25/18  1856   PHV 7.43  --   --   --    PCO2V 49  --   --   --    PO2V 37  --   --   --    HCO3V 32*  --   --   --    STACY 6.9  --   --   --    O2PER 2L 50.0 100.0 60.0     Mg:  2.2    ASSESSMENT & PLAN :    Rose Santos is a 51yo female with PMH of COPD and squamous cell lung carcinoma s/p RUL and RML lobectomy 2013 and chemotherapy; with multiple recurrences requiring chemotherapy and radiation therapy. Transferred to [facility] 7/25 with acute hypercapnic respiratory failure requiring intubation 7/25 likely due to right and left mainstem bronchi infiltrations vs. mass effect. Tissue debulking 7/26 and returned to ICU extubated without respiratory difficulty.  Tf to medicine floor 07/27/18.     PMH  Oncologic history: Squamous cell carcinoma of R lung   -  s/p RLL and treatment with cisplatin and etoposide.  - XRT for mediastinal recurrentce in 2014.   - Recurrence in R mainstem bronchus in 10/2017. A bronchoscopy on 7/12/2018 showed necrotizing granulomas and fungal hyphae c/w aspergillus.   - She was initially treated with voriconazole, but this was held due to dyspnea that occurred with administration.     # Acute HC respiratory failure 2/2 advanced NSCLC with recurrence, now with bilateral mainstem disease   Intubated 7/25. Possibly multifactorial with right and left mainstem bronchi infiltrations, COPD, pulmonary infection. Tumor infiltrations are likely the major cause of respiratory failure. Better after following procedure. Now extubated. On NC.     Procedure(s):07.26.18  Flexible and Rigid Bronchoscopy with Stent Placement, Tumor Debulking using Cryoablation and Argon Plasma Coagulation, Bronchial Washing - Wound Class: II-Clean Contaminated   - Wound Class: II-Clean Contaminated     MRSA swab neg.    Doing better.   Fu cxr - better.     - Ct to monitor closely respiratory status, ct pulse ox: doing better. On 1-2 L NC sating high 90s. Was using oxygen at home as needed per patient.   Titrate oxygen to keep sat >92   - Ct current nebs- duonebs, saline nebs. aerobika.   - resume home albuterol nebs. Advair.   - aggressive pulm toileting  - 07.28: reviewed case including R pleural  effusion with Pulmonary. Aw Pulm input.   - Oncology consult appreciated. recs reviewed. Outpatient fu.   - Leucocytosis- resolved. Off abx. Monitor off abx. 7/29/2018: procal not elevated.   - FU cultures; BAL: NGTD    - 7/29/2018: trial of Lasix 20 mg Iv x 1.     # CVS: Echo 8/2017 EF 65%    7/28/2018: Sinus bradycardia: w LH, feeling funny. Nausea. BP maintained. Oxygen unchanged high 90s on 1L. No cp or sob.   EKG, reviewed w Cardiology fellow. Sinus bradycardia. Unchanged from prior. No acute st t change. Trop neg. Unlikely ACS.   Improved w ivf. Narcan. Narcotics held. Tf to 6b for close monitoring. Tele.   EMR: noted intermittent bradycardia in 40s while in ICU as well. ? Chronic vs acute.     ECHO: 7/29/2018:   Left ventricular function, chamber size, wall motion, and wall thickness are normal.The EF is 60-65%.  Right ventricular function, chamber size, wall motion, and thickness are normal.  No pericardial effusion is present.  Dilation of the inferior vena cava is present with abnormal respiratory variation in diameter.    ? Related to Drug withdrawal (not getting home topamax, lexapro).     - 7/29/2018:   Overall feels better. Above symptoms better.     - Ct to hold Oxycontin. Resume Oxycodone prn for pain- to avoid withdrawal.   - dc ivf given concern for pulm edema. Try lasix 20 mg iv x 1.   - Resume home medications : topamax @ lower dose. Lexapro.   - replace lytes K mg per protocol  - CT tele. I/o. Bmp, mg.   - Formal cardiology consult prn.     # Depression: resume PTA lexapro (7/29/2018)  # Insomnia: resume PTA Trazodone (7/29/2018)  # Neuropathy: resume PTA topamax ( 7/29/2018) at lower dose. D/w Pharm D    # Headache: chronic with some minimal complaints post-op.  - Caffeine pill    # GI: nausea;   - Antiemetics prn.    - PPI    # Hypokalemia: K protocol. Resume home KDUR 20 bid (7/29/2018)    # Pain Assessment:  Current Pain Score 7/29/2018   Patient currently in pain? yes   Pain score (0-10) -    Pain location -   CPOT pain score -     Scheduled tylenol 975 tid  Oxycodone prn.   Holding Oxycontin.       FEN: regular diet. Dc ivf  DVT/GI prophylaxis: Enoxaparin (Lovenox) SQ, PPI  Code Status: FULL CODE  Lines & tubes: Port a cath.     Dispo: Disposition Plan   Expected discharge in 1-2 days to prior living arrangement once medically ready.     Entered: Caden Corrales 07/29/2018, 2:09 PM         Plan discussed with patient, bedside RN and KERRY CC during Care Team Rounds.  D/w Pharm D.     Caden Corrales MD   Hospitalist (Internal Medicine)  Pager: [phone].

## 2018-07-30 NOTE — PLAN OF CARE
Problem: Patient Care Overview  Goal: Plan of Care/Patient Progress Review  Discharge Planner OT   Patient plan for discharge: home  Current status: Pt ambulating in hallway ~6 min with SBA supervision only, noted increased dyspnea, VSS. Completing basic ADLs SBA setup only  Barriers to return to prior living situation: respiratory status  Recommendations for discharge: home with assist prn  Rationale for recommendations: Pt continues to demonstrate ARAUZ and her overall functional endurance is below baseline for I/ADLs; utilizing energy conservation principles and compensating accordingly       Entered by: Ranjana Yun 07/30/2018 9:29 AM

## 2018-07-30 NOTE — PLAN OF CARE
Problem: Patient Care Overview  Goal: Plan of Care/Patient Progress Review  Outcome: No Change  [id number]: VSS on 1L O2 via nc. HR sustained in 60s overnight. BP stable 130s/80s.   Neuro: A&Ox4, flat affect. No neuro deficits.   Cardiac: BPs 130s/80s. HR sustained in 60s. Denied dizziness this shift.  Resp: 1L O2 via nc. Denies SOB. LS coarse, wheezes expiratory.  GI/: Voiding adequate amounts via bedside commode. Last BM 7/29/18. Denied nausea, eating small snacks overnight.  Diet/Appetite: Tolerating regular diet. Fair appetite.  Skin: No deficits.  Access: R) port-a-cath SL (need hep lock orders), PIV SL  Drains: none  Activity: Up SBA  Pain: PRN Oxycodone 10mg x2. Declined scheduled Tylenol.  Monitor fatigue, nausea, weakness, and HR.    Will continue with plan of care and notify MD of any changes.       Problem: Pain, Chronic (Adult)  Goal: Identify Related Risk Factors and Signs and Symptoms  Related risk factors and signs and symptoms are identified upon initiation of Human Response Clinical Practice Guideline (CPG).   Outcome: Improving   07/30/18 0543   Pain, Chronic   Related Risk Factors (Chronic Pain) disease process   Signs and Symptoms (Chronic Pain) fatigue/weakness

## 2018-07-30 NOTE — DISCHARGE SUMMARY
Discharge Summary    Rose Santos MRN# [id number]   YOB: 1965 Age: 52 year old     Date of Admission:  7/25/2018  Date of Discharge:  7/30/2018  3:06 PM  Admitting Physician:  Van Hylton MD  Discharge Physician:  Caden Corrales MD   Discharging Service:  Internal Medicine     Primary Provider: Asia Ly           Discharge Diagnosis:   Acute on chronic respiratory failure with hypoxia   Advanced NSCLC with recurrence, now with bilateral mainstem disease   Sinus bradycardia likely medications related.          Procedures:       Procedure(s):07.26.18  Flexible and Rigid Bronchoscopy with Stent Placement, Tumor Debulking using Cryoablation and Argon Plasma Coagulation, Bronchial Washing - Wound Class: II-Clean Contaminated   - Wound Class: II-Clean Contaminated               Consultations:   Consultation during this admission received from Hem Onc. Pulmonary               Brief History of Illness:   For details please refer to H and P dated 7/25/2018  Briefly,   Rose Santos is a 53yo female with PMH of COPD and squamous cell lung carcinoma s/p RUL and RML lobectomy 2013 and chemotherapy; with multiple recurrences requiring chemotherapy and radiation therapy. Transferred to [facility] 7/25 with acute hypercapnic respiratory failure requiring intubation 7/25 likely due to right and left mainstem bronchi infiltrations vs. mass effect. Tissue debulking 7/26 and returned to ICU extubated without respiratory difficulty.  Tf to medicine floor 07/27/18.      PMH  Oncologic history: Squamous cell carcinoma of R lung   -  s/p RLL and treatment with cisplatin and etoposide.  - XRT for mediastinal recurrentce in 2014.   - Recurrence in R mainstem bronchus in 10/2017. A bronchoscopy on 7/12/2018 showed necrotizing granulomas and fungal hyphae c/w aspergillus.   - She was initially treated with voriconazole, but this was held due to dyspnea that occurred with administration.           Hospital  Course:   Issues:     # Acute HC respiratory failure 2/2 advanced NSCLC with recurrence, now with bilateral mainstem disease   Intubated 7/25. Possibly multifactorial with right and left mainstem bronchi infiltrations, COPD, pulmonary infection. Tumor infiltrations are likely the major cause of respiratory failure. Better after following procedure. Now extubated. On NC.     Procedure(s):07.26.18  Flexible and Rigid Bronchoscopy with Stent Placement, Tumor Debulking using Cryoablation and Argon Plasma Coagulation, Bronchial Washing - Wound Class: II-Clean Contaminated   - Wound Class: II-Clean Contaminated      MRSA swab neg.    Fu cxr - better.     - Ct NC oxygen as needed to keep sat >92   - Ct nebs- duonebs, saline nebs. aerobika.   - Ct home albuterol nebs. Advair.   - aggressive pulm toileting   - Oncology consult appreciated. recs reviewed. Outpatient fu.   - Leucocytosis- resolved. Off abx. stable off abx. 7/29/2018: procal not elevated.   - FU cultures; BAL: NGTD     * FU with PUlmonary as outpatient.      # CVS: Echo 8/2017 EF 65%     7/28/2018: Sinus bradycardia: w LH, feeling funny. Nausea. BP maintained. Oxygen unchanged high 90s on 1L. No cp or sob.   EKG, reviewed w Cardiology fellow. Sinus bradycardia. Unchanged from prior. No acute st t change. Trop neg. Unlikely ACS.   Improved w ivf. Narcan. Narcotics held. Tf to 6b for close monitoring. Tele.   EMR: noted intermittent bradycardia in 40s while in ICU as well. ? Chronic vs acute.      ECHO: 7/29/2018:   Left ventricular function, chamber size, wall motion, and wall thickness are normal.The EF is 60-65%.  Right ventricular function, chamber size, wall motion, and thickness are normal.  No pericardial effusion is present.  Dilation of the inferior vena cava is present with abnormal respiratory variation in diameter.     ? Related to Drug withdrawal (not getting home topamax, lexapro).      - 7/29/2018:   Overall feels better. Asymptomatic.      - Ct to  "hold Oxycontin. Resumed Oxycodone prn for pain- to avoid withdrawal. Fu with PCP.   - Resume home medications : topamax @ lower dose. Titrate up as morgan. See dc instructions. Lexapro.   - replaced lytes K mg per protocol  - HR better at the time of discharge >60.      # Depression:  PTA lexapro  # Insomnia:  PTA Trazodone   # Neuropathy: PTA topamax      # Headache: chronic with some minimal complaints post-op.  - better.      # GI: nausea;   - Antiemetics prn.    - PPI  Better.      # Hypokalemia: K protocol.   Ct pta kcl.         DVT/GI prophylaxis: Enoxaparin (Lovenox) SQ, PPI  Code Status: FULL CODE  Lines & tubes: Port a cath.          # Discharge Pain Plan:   Pain controlled at the time of discharge.   Pain Mx as above.   Holding Oxycontin.          Discharge Day Exam:    Interval/Subjective:     Feels better  HR better  Asymptomatic  Breathing better  No fc  No NV  Wants to go home.     Blood pressure 120/74, pulse 72, temperature 98  F (36.7  C), temperature source Oral, resp. rate 20, height 1.575 m (5' 2\"), weight 66.7 kg (147 lb 0.8 oz), SpO2 99 %.    Wt Readings from Last 5 Encounters:   18 66.7 kg (147 lb 0.8 oz)       Temp (24hrs), Av.9  F (36.6  C), Min:97.4  F (36.3  C), Max:98.8  F (37.1  C)    General: alert, interactive, NAD  HEENT: AT/NC, anicteric, Moist MM  Neck: Supple, no JVD or Lymphadenopathy . No stridor  Respi/Chest: non labored. bl coarse crackles. Diminished R base.   CVS/Heart: S1S2 regular, no m/r/g,   Gi/Abd: Soft, non tender, non distended  MSK/Ext: Distal pulses 2+.     Neuro: AO x 4, no new focal deficit.               Significant Results Obtained During Hospitalization:   All relevant data  Reviewed.      Lab Results   Component Value Date    WBC 7.4 2018    HGB 12.1 2018    HCT 39.4 2018     2018     2018    POTASSIUM 3.8 2018    CHLORIDE 105 2018    CO2 27 2018    BUN 6 (L) 2018    CR 0.60 2018    " "GLC 92 07/30/2018    TROPI <0.015 07/28/2018    AST 29 07/25/2018    ALT 33 07/25/2018    ALKPHOS 64 07/25/2018    BILITOTAL 0.2 07/25/2018    INR 1.04 07/25/2018      Recent Results (from the past 48 hour(s))   XR Chest Port 1 View    Narrative     Examination:  XR CHEST PORT 1 VW     Date:  7/29/2018 12:22 PM      Clinical Information: dyspnea, wheezing;      Additional Information: none    Comparison: 7/28/2018, 10:20 5:00 AM.    Findings:     Accounting for technical differences there is interval reduction in  lung volumes. There is slight prominence of the interstitium of the  lungs which may represent mild interstitial edema. There are chronic  changes involving the right upper hemithorax, unchanged. The  right-sided port has a tip projected in lower SVC near the right  atrium. The upper abdominal bowel gas pattern is normal.      Impression    Impression:    1. Possible increasing mild pulmonary edema.  2. Chronic changes right hemithorax which are stable..    JINA BAKER MD                     Pending Results:     Unresulted Labs Ordered in the Past 30 Days of this Admission     Date and Time Order Name Status Description    7/26/2018 0844 Surgical pathology exam In process                Condition on Discharge:   Discharge condition: Stable   Discharge vitals: Blood pressure 120/74, pulse 72, temperature 98  F (36.7  C), temperature source Oral, resp. rate 20, height 1.575 m (5' 2\"), weight 66.7 kg (147 lb 0.8 oz), SpO2 99 %.                  Discharge Medications:     Current Discharge Medication List      START taking these medications    Details   sodium chloride 3 % NEBU neb solution Take 3 mLs by nebulization 2 times daily  Qty: 84 mL, Refills: 1    Associated Diagnoses: Acute on chronic respiratory failure with hypoxia (H)         CONTINUE these medications which have NOT CHANGED    Details   albuterol (PROAIR HFA/PROVENTIL HFA/VENTOLIN HFA) 108 (90 Base) MCG/ACT Inhaler Inhale 2 puffs into the " lungs every 4 hours as needed for shortness of breath / dyspnea or wheezing      ESCITALOPRAM OXALATE PO Take 10 mg by mouth daily      fluticasone-salmeterol (ADVAIR) 500-50 MCG/DOSE diskus inhaler Inhale 1 puff into the lungs 2 times daily      ipratropium - albuterol 0.5 mg/2.5 mg/3 mL (DUONEB) 0.5-2.5 (3) MG/3ML neb solution Take 1 vial by nebulization every 6 hours as needed for shortness of breath / dyspnea or wheezing      LEVOTHYROXINE SODIUM PO Take 150 mcg by mouth      naloxone (NARCAN) nasal spray Spray 4 mg into one nostril alternating nostrils as needed for opioid reversal every 2-3 minutes until assistance arrives      OMEPRAZOLE PO Take 20 mg by mouth every morning      OXYCODONE HCL PO Take 5 mg by mouth every 4 hours as needed (Pain) Max of 6 tabs per day      polyethylene glycol (MIRALAX/GLYCOLAX) Packet Take 1 packet by mouth 2 times daily as needed for constipation      potassium chloride ana er (K-DUR) Take 20 mEq by mouth 2 times daily      Prochlorperazine Maleate (COMPAZINE PO) Take 10 mg by mouth every 6 hours as needed for nausea      TOPIRAMATE PO Take 50 mg by mouth Take 150mg daily in AM, and 200mg daily at bedtime      TRAZODONE HCL PO Take 50 mg by mouth At Bedtime         STOP taking these medications       OxyCODONE HCl (OXYCONTIN PO) Comments:   Reason for Stopping:                      Discharge Disposition:   Discharged to home             Discharge Instructions:     Discharge Procedure Orders  Reason for your hospital stay   Order Comments: Transferred to [facility] 7/25 with acute hypercapnic respiratory failure requiring intubation 7/25 likely due to right and left mainstem bronchi infiltrations vs. mass effect s/p Tissue debulking 7/26 by Pulmonary and returned to ICU-> extubated without respiratory difficulty.  In the floor issues with Sinus bradycardia, somnolence. Improved after holding Oxycontin, resumed home meds including topamax. Lexapro.    Procedure(s):07.26.18  Flexible  and Rigid Bronchoscopy with Stent Placement, Tumor Debulking using Cryoablation and Argon Plasma Coagulation, Bronchial Washing.      Per Pulmonary:     -->  3% saline neb twice daily  -->  Aerobika twice daily  -->  Airway examination in 4-6 weeks     Adult [facility] Follow-up and recommended labs and tests   Order Comments: Follow up with primary care provider, Asia Ly, within 7 days to evaluate after surgery and for hospital follow- up.  The following labs/tests are recommended: cbc, bmp, mg.    Follow up with Pulmonary team in 3-4 weeks or as instructed.     Follow up with Oncology team as scheduled.     Appointments on [place] and/or [facility] (with [facility] or [facility] provider or service). Call [phone] if you haven't heard regarding these appointments within 7 days of discharge.     Activity   Order Comments: Your activity upon discharge: activity as tolerated   Order Specific Question Answer Comments   Is discharge order? Yes      Oxygen Adult   Order Comments: Renew Home Oxygen Order  Renew previous prescription.  Expected treatment length is indefinite (99 months).    Attending Provider: Caden Corrales  Physician signature: See electronic signature associated with these discharge orders  Date of Order: July 30, 2018     Diet   Order Comments: Follow this diet upon discharge: Orders Placed This Encounter     Advance Diet as Tolerated: Regular Diet Adult   Order Specific Question Answer Comments   Is discharge order? Yes             Thank you for the opportunity to participate in the care of your patient.  Please do not hesitate to contact our service with questions or concerns.    Total time spent was greater than 30 minutes; Greater than 50% of the time was in counseling and care coordination. Care coordination included discussion of medication changes, lifestyle changes and reviewing instructions to the patient.     D/W RN, KERRY Corrales MD   Hospitalist (Internal  Medicine)  Pager: [phone].     DOS : 07.30.18

## 2018-07-30 NOTE — PLAN OF CARE
DISCHARGE                         No discharge date for patient encounter.  ----------------------------------------------------------------------------  Discharged to: Home  Via: private transportation  Accompanied by: Family  Discharge Instructions: regular diet, activity as tolerated, medications, follow up appointments, when to call the MD, aftercare instructions.  Prescriptions: To be filled by [facility]; medication list reviewed & sent with pt  Follow Up Appointments: information given  Belongings: All sent with pt  IV: FILIPE d/c'd, dasia deaalfredo  Telemetry: d/c'd  Pt exhibits understanding of above discharge instructions; all questions answered.    Discharge Paperwork: Signed, copied, and sent home with patient.

## 2018-07-30 NOTE — PLAN OF CARE
Problem: Patient Care Overview  Goal: Plan of Care/Patient Progress Review  Neuro: A&Ox4. Flat affect.  Cardiac: SR. VSS.   Respiratory: Sating 96 on RA.  GI/: Adequate urine output.   Diet/appetite: Tolerating Regular diet. Eating well.  Activity:  Assist of 1, up to chair and in halls.  Pain: At acceptable level on current regimen.   Skin: No new deficits noted. Showered with assist of 1.  LDA's:1 Portacath SL, and PIV SL    Plan: Continue with POC. Notify primary team with changes. Potassium 2.3 replaced redraw at 0030. 2000 dose of potassium held per MD until redraw from replacement at 0030.

## 2018-07-30 NOTE — PROGRESS NOTES
Care Coordinator Progress Note    Admission Date/Time:  7/25/2018  Attending MD:  Van Hylton MD    Data  Chart reviewed, discussed with interdisciplinary team.   Patient was admitted for: Data Unavailable.    Concerns with insurance coverage for discharge needs: None.  Current Living Situation: Patient lives with adult child.  Support System: Supportive     Services Involved: Oxygen  Transportation at Discharge: Car  Transportation to Medical Appointments:   - Not applicable  Barriers to Discharge: No No barriers identified that would impact discharge to home       Assessment  This RNCC met with patient at bedside to discuss DC planning, patient sleeping and declined to participate with dc planning assessment; however patient states [employer] will provide ride home and bring e tank. Denied any further concerns related to discharge.     Plan  Anticipated Discharge Date:  TBD  Anticipated Discharge Plan:  Home    Court REESE RN CCM    RN Care Coordinator covering for 6B  E-mail: [email or web address]  Phone: [phone]  Pager: [phone]

## 2018-07-30 NOTE — DISCHARGE INSTRUCTIONS
Pulmonary Recommendations:     --> Successful flexible, rigid bronchoscopy, airway dilation, stent placement in LM, therapeutic suctioning, and tumor debulking.   -->  3% saline neb twice daily  -->  Aerobika twice daily  -->  Airway examination in 4-6 weeks     Follow up with rad onc and primary oncologist.    >> Hold Oxycontin for now. Follow up with your pain team for further recommendations and pain management.   >> Increase topamax 100 mg twice daily x 2 days and then go back to your prior to admission dosing.

## 2018-07-31 NOTE — PLAN OF CARE
DISCHARGE                         7/31/2018  1145 AM  ----------------------------------------------------------------------------  Discharged to: Home  Via: private transportation  Accompanied by: Family  Discharge Instructions: regular diet, activity as tolerated, medications, follow up appointments, when to call the MD, aftercare instructions.  Prescriptions: To be filled by [place] discharge pharmacy; medication list reviewed & sent with pt  Follow Up Appointments: arranged; information given  Belongings: All sent with pt  IV: d/c'd  Telemetry: N/A  Pt exhibits understanding of above discharge instructions; all questions answered.    Discharge Paperwork: Signed, copied, and sent home with patient.

## 2018-07-31 NOTE — TELEPHONE ENCOUNTER
"[facility] Call Center    Phone Message    May a detailed message be left on voicemail: yes    Reason for Call: Other: Pt was discharged from the hospital on 7/30 and discharge orders state to f/u in 3-4 weeks \"or as instructed\". I wasnt able to find an appointment within that time frame. Please contact pt if f/u appt is needed. Thanks!     Action Taken: Message routed to:  [facility] ([facility]): Pulmonary    "

## 2018-08-03 NOTE — TELEPHONE ENCOUNTER
Spoke with patient to schedule procedure with Dr. Yared Swanson  Procedure was scheduled on 09/13  Patient will have H&P with PCP, Shweta Ro [place]   Patient is aware a / is needed day of surgery.   Surgery letter was sent via Mail, patient has my direct contact information for any further questions.

## (undated) DEVICE — SOL NACL 0.9% IRRIG 1000ML BOTTLE 2F7124

## (undated) DEVICE — DRSG KERLIX 4 1/2"X4YDS ROLL 6715

## (undated) DEVICE — ESU ERBE FIAPC PROBE 2.3MMX220CM

## (undated) DEVICE — TUBING SUCTION 10'X3/16" N510

## (undated) DEVICE — JELLY LUBRICATING SURGILUBE 2OZ TUBE

## (undated) DEVICE — ANTIFOG SOLUTION W/FOAM PAD 31142527

## (undated) DEVICE — ENDO TOOTH GUARD SAC2001

## (undated) DEVICE — LABEL MEDICATION SYSTEM 3303-P

## (undated) DEVICE — INFLATION DEVICE BIG 60 ENDO-AN6012

## (undated) DEVICE — PITCHER STERILE 1000ML  SSK9004A

## (undated) DEVICE — CATH BALLOON ELATION PULMONARY 2CM 12-13.5-15MMX100CM P12L20

## (undated) DEVICE — DRSG TELFA 3X8" 1238

## (undated) DEVICE — SYR 30ML SLIP TIP W/O NDL 302833

## (undated) DEVICE — ENDO VALVE BX EVIS MAJ-210

## (undated) DEVICE — KIT ENDO FIRST STEP DISINFECTANT 200ML W/POUCH EP-4

## (undated) DEVICE — SPECIMEN TRAP MUCOUS 40ML LUKI C30200A

## (undated) DEVICE — ENDO VALVE SUCTION BRONCH EVIS MAJ-209

## (undated) RX ORDER — PROPOFOL 10 MG/ML
INJECTION, EMULSION INTRAVENOUS
Status: DISPENSED
Start: 2018-01-01

## (undated) RX ORDER — IOPAMIDOL 408 MG/ML
INJECTION, SOLUTION INTRAVASCULAR
Status: DISPENSED
Start: 2018-01-01

## (undated) RX ORDER — FENTANYL CITRATE 50 UG/ML
INJECTION, SOLUTION INTRAMUSCULAR; INTRAVENOUS
Status: DISPENSED
Start: 2018-01-01

## (undated) RX ORDER — ONDANSETRON 2 MG/ML
INJECTION INTRAMUSCULAR; INTRAVENOUS
Status: DISPENSED
Start: 2018-01-01

## (undated) RX ORDER — GLYCOPYRROLATE 0.2 MG/ML
INJECTION, SOLUTION INTRAMUSCULAR; INTRAVENOUS
Status: DISPENSED
Start: 2018-01-01

## (undated) RX ORDER — IPRATROPIUM BROMIDE AND ALBUTEROL SULFATE 2.5; .5 MG/3ML; MG/3ML
SOLUTION RESPIRATORY (INHALATION)
Status: DISPENSED
Start: 2018-01-01

## (undated) RX ORDER — DEXAMETHASONE SODIUM PHOSPHATE 4 MG/ML
INJECTION, SOLUTION INTRA-ARTICULAR; INTRALESIONAL; INTRAMUSCULAR; INTRAVENOUS; SOFT TISSUE
Status: DISPENSED
Start: 2018-01-01